# Patient Record
Sex: MALE | Race: BLACK OR AFRICAN AMERICAN | Employment: FULL TIME | URBAN - METROPOLITAN AREA
[De-identification: names, ages, dates, MRNs, and addresses within clinical notes are randomized per-mention and may not be internally consistent; named-entity substitution may affect disease eponyms.]

---

## 2021-01-25 ENCOUNTER — OFFICE VISIT (OUTPATIENT)
Dept: ORTHOPEDIC SURGERY | Age: 26
End: 2021-01-25
Payer: MEDICAID

## 2021-01-25 VITALS — HEIGHT: 68 IN | TEMPERATURE: 98.2 F | WEIGHT: 162 LBS | BODY MASS INDEX: 24.55 KG/M2

## 2021-01-25 DIAGNOSIS — M25.511 ACUTE PAIN OF RIGHT SHOULDER: Primary | ICD-10-CM

## 2021-01-25 DIAGNOSIS — S29.011A RUPTURE OF PECTORALIS MAJOR MUSCLE, INITIAL ENCOUNTER: ICD-10-CM

## 2021-01-25 PROCEDURE — 99203 OFFICE O/P NEW LOW 30 MIN: CPT | Performed by: ORTHOPAEDIC SURGERY

## 2021-01-25 SDOH — HEALTH STABILITY: MENTAL HEALTH: HOW MANY STANDARD DRINKS CONTAINING ALCOHOL DO YOU HAVE ON A TYPICAL DAY?: NOT ASKED

## 2021-01-25 NOTE — PROGRESS NOTES
Height: 5' 8\" (1.727 m)  Weight: [unfilled]  BMI:  Body mass index is 24.63 kg/m². General: The patient is alert and oriented x 3, appears to be stated age and in no distress. HEENT: head is normocephalic, atraumatic. EOMI. Neck: supple, trachea midline, no thyromegaly   Cardiovascular: peripheral pulses palpable. Normal Capillary refill   Respiratory: breathing unlabored, chest expansion symmetric   Skin: no rash, no open wounds, no erythema  Psych: normal affect; mood stable  Neurologic: gait normal, sensation grossly intact in extremities  MSK:    Cervical Spine: There is no tenderness to palpation along the cervical spine. Range of motion is normal.  Spurling's is negative    Shoulder Exam:   Exam shows some bruising along the medial aspect of the upper arm, as well as some asymmetry and contour the pectoralis major tendon. There is some tenderness with palpation across the pec. There is good range of motion of the shoulder. Biceps appears normal    IMAGING:     XRAY:  3 views of the right shoulder show no acute abnormality    Impression: Normal shoulder x-ray    Radiographic findings reviewed with patient    ASSESSMENT   Right shoulder pectoralis major tear      PLAN  We discussed his injury today. Concern is for pec tear. He is young and active and wants to remain active and continue with heavy lifting. I would like to obtain an MRI to assess for partial versus complete rupture. We will discuss treatment options pending MRI results.         Yareli Dubois MD  Orthopaedic Surgery   1/25/21  2:39 PM

## 2021-01-26 ENCOUNTER — HOSPITAL ENCOUNTER (OUTPATIENT)
Dept: MRI IMAGING | Age: 26
Discharge: HOME | End: 2021-01-28
Payer: MEDICAID

## 2021-01-26 DIAGNOSIS — S29.011A RUPTURE OF PECTORALIS MAJOR MUSCLE, INITIAL ENCOUNTER: ICD-10-CM

## 2021-01-26 PROCEDURE — 73221 MRI JOINT UPR EXTREM W/O DYE: CPT

## 2021-02-03 ENCOUNTER — OFFICE VISIT (OUTPATIENT)
Dept: ORTHOPEDIC SURGERY | Age: 26
End: 2021-02-03
Payer: MEDICAID

## 2021-02-03 VITALS — TEMPERATURE: 97.2 F

## 2021-02-03 DIAGNOSIS — Z01.818 PRE-OPERATIVE EXAM: Primary | ICD-10-CM

## 2021-02-03 DIAGNOSIS — S29.011A RUPTURE OF PECTORALIS MAJOR MUSCLE, INITIAL ENCOUNTER: ICD-10-CM

## 2021-02-03 PROCEDURE — 99213 OFFICE O/P EST LOW 20 MIN: CPT | Performed by: ORTHOPAEDIC SURGERY

## 2021-02-03 RX ORDER — KETOROLAC TROMETHAMINE 10 MG/1
10 TABLET, FILM COATED ORAL EVERY 6 HOURS PRN
Qty: 8 TABLET | Refills: 0 | Status: SHIPPED
Start: 2021-02-03 | End: 2021-02-19

## 2021-02-03 RX ORDER — HYDROCODONE BITARTRATE AND ACETAMINOPHEN 5; 325 MG/1; MG/1
1 TABLET ORAL EVERY 6 HOURS PRN
Qty: 20 TABLET | Refills: 0 | Status: SHIPPED
Start: 2021-02-03 | End: 2021-02-08

## 2021-02-03 RX ORDER — KETOROLAC TROMETHAMINE 10 MG/1
10 TABLET, FILM COATED ORAL EVERY 6 HOURS
Qty: 8 TABLET | Refills: 0 | Status: SHIPPED
Start: 2021-02-03 | End: 2021-02-19

## 2021-02-03 RX ORDER — HYDROCODONE BITARTRATE AND ACETAMINOPHEN 5; 325 MG/1; MG/1
1 TABLET ORAL EVERY 6 HOURS PRN
Qty: 28 TABLET | Refills: 0 | Status: SHIPPED | OUTPATIENT
Start: 2021-02-03 | End: 2021-02-10

## 2021-02-03 NOTE — PROGRESS NOTES
Department of Orthopedic Surgery  Attending Pre-operative History and Physical        DIAGNOSIS:  Right shoulder pectoralis major tear    INDICATION:  Failed conservative management    PROCEDURE:  RIGHT PECTORALIS REPAIR   ([address])    CHIEF COMPLAINT:  Right arm pain    History Obtained From:  patient    HISTORY OF PRESENT ILLNESS:      The patient is a 26 y.o. male with significant past medical history of right pectoralis major injury while bench pressing. He felt a pop in the shoulder. He was seen in the office and clinical exam showed bruising is asymmetry in the pectoralis muscle consistent with tear. This was confirmed with MRI. Given his young age and activity level he was offered surgical management. We discussed this would involve right pectoralis major repair. We discussed surgery including risks which include but are not limited to infection, retear, partial tear not requiring repair, DVT/pulmonary embolus, neurovascular injury, unforeseen risks. He understands would like to proceed. Past Medical History:    No past medical history on file. Past Surgical History:        Procedure Laterality Date    ANTERIOR CRUCIATE LIGAMENT REPAIR Left 2015       Medications Prior to Admission:   Not in a hospital admission. Allergies:  Patient has no known allergies. History of allergic reaction to anesthesia:  Yes, PONV     Social History:   TOBACCO:   reports that he has never smoked. He has never used smokeless tobacco.  ETOH:   reports current alcohol use. DRUGS:   reports current drug use. Drug: Marijuana. Family History:   No family history on file.     REVIEW OF SYSTEMS:  CONSTITUTIONAL:  negative  RESPIRATORY:  negative  CARDIOVASCULAR:  negative  MUSCULOSKELETAL:  negative except for  HPI  NEUROLOGICAL:  negative    PHYSICAL EXAM:     VITALS:  Temp 97.2 °F (36.2 °C) (Infrared)     Gen: AOx3, NAD    Heart:  normal apical pulses, normal S1 and S2 and no edema    Lungs:  No increased work of breathing, good air exchange     Abdomen:  no scars, non-distended and non-tender    Extremities:  No clubbing, cyanosis, or edema    Musculoskeletal: Exam shows some bruising remaining over the upper right arm. There is abnormality the contour of the pectoralis as it crosses the deltopectoral interval.  He has some pain with resisted adduction. Remainder of shoulder exam is benign. He has no motor and sensory deficits      DATA:  CBC: No results found for: WBC, RBC, HGB, HCT, MCV, MCH, MCHC, RDW, PLT, MPV  BMP:  No results found for: NA, K, CL, CO2, BUN, LABALBU, CREATININE, CALCIUM, GFRAA, LABGLOM, GLUCOSE    Radiology Review: MRI reviewed showing tear probably more musculotendinous junction of the pectoralis major    ASSESSMENT AND PLAN:    1. Patient is a 26 y.o. male with above specified procedure planned right pectoralis major repair with anesthesia    2. Procedure options, risks and benefits reviewed with patient. Patient expresses understanding and has signed consent form to proceed.     3.  Patient and family were provided with pre-op and post-op instructions, prescription medications, and any other supplied needed post op (slings, braces, etc.)    Controlled Substances Monitoring:            Farhad Barreto MD  Orthopaedic Surgery   2/3/21  3:02 PM

## 2021-02-05 ENCOUNTER — HOSPITAL ENCOUNTER (OUTPATIENT)
Age: 26
Discharge: HOME | End: 2021-02-07
Payer: MEDICAID

## 2021-02-05 DIAGNOSIS — Z01.818 PREOP TESTING: ICD-10-CM

## 2021-02-05 PROCEDURE — U0003 INFECTIOUS AGENT DETECTION BY NUCLEIC ACID (DNA OR RNA); SEVERE ACUTE RESPIRATORY SYNDROME CORONAVIRUS 2 (SARS-COV-2) (CORONAVIRUS DISEASE [COVID-19]), AMPLIFIED PROBE TECHNIQUE, MAKING USE OF HIGH THROUGHPUT TECHNOLOGIES AS DESCRIBED BY CMS-2020-01-R: HCPCS

## 2021-02-06 LAB
SARS-COV-2: NOT DETECTED
SOURCE: NORMAL

## 2021-02-08 NOTE — PROGRESS NOTES
[facility] PRE-ADMISSION TESTING INSTRUCTIONS    The Preadmission Testing patient is instructed accordingly using the following criteria (check applicable):    ARRIVAL INSTRUCTIONS:  [x] Parking the day of Surgery is located in the Main Entrance lot. Upon entering the door, make an immediate right to the surgery reception desk    [x] Bring photo ID and insurance card    [] Bring in a copy of Living will or Durable Power of  papers. [x] Please be sure to arrange for responsible adult to provide transportation to and from the hospital    [x] Please arrange for responsible adult to be with you for the 24 hour period post procedure due to having anesthesia      GENERAL INSTRUCTIONS:    [x] Nothing by mouth after midnight, including gum, candy, mints or water    [x] You may brush your teeth, but do not swallow any water    [] Take medications as instructed with 1-2 oz of water    [x] Stop herbal supplements and vitamins 5 days prior to procedure    [] Follow preop dosing of blood thinners per physician instructions    [] Take 1/2 dose of evening insulin, but no insulin after midnight    [] No oral diabetic medications after midnight    [] If diabetic and have low blood sugar or feel symptomatic, take 1-2oz apple juice only    [] Bring inhalers day of surgery    [] Bring C-PAP/ Bi-Pap day of surgery    [] Bring urine specimen day of surgery    [x] Shower or bath with soap, lather and rinse well, AM of Surgery, no lotion, powders or creams     [] Follow bowel prep as instructed per surgeon    [x] No tobacco products within 24 hours of surgery     [x] No alcohol or illegal drug use within 24 hours of surgery.     [x] Jewelry, body piercing's, eyeglasses, contact lenses and dentures are not permitted into surgery (bring cases)      [] Please do not wear any nail polish, make up or hair products on the day of surgery [x] You can expect a call the business day prior to procedure to notify you if your arrival time changes    [x] If you receive a survey after surgery we would greatly appreciate your comments    [] Parent/guardian of a minor must accompany their child and remain on the premises  the entire time they are under our care     [] Pediatric patients may bring favorite toy, blanket or comfort item with them    [] A caregiver or family member must remain with the patient during their stay if they are mentally handicapped, have dementia, disoriented or unable to use a call light or would be a safety concern if left unattended    [x] Please notify surgeon if you develop any illness between now and time of surgery (cold, cough, sore throat, fever, nausea, vomiting) or any signs of infections  including skin, wounds, and dental.    []  The Outpatient Pharmacy is available to fill your prescription here on your day of surgery, ask your preop nurse for details    [] Other instructions    EDUCATIONAL MATERIALS PROVIDED:    [] PAT Preoperative Education Packet/Booklet     [] Medication List    [] Transfusion bracelet applied with instructions    [] Shower with soap, lather and rinse well, and use CHG wipes provided the evening before surgery as instructed    [] Incentive spirometer with instructions

## 2021-02-10 ENCOUNTER — ANESTHESIA EVENT (OUTPATIENT)
Dept: OPERATING ROOM | Age: 26
End: 2021-02-10
Payer: MEDICAID

## 2021-02-11 ENCOUNTER — HOSPITAL ENCOUNTER (OUTPATIENT)
Age: 26
Setting detail: OUTPATIENT SURGERY
Discharge: HOME | End: 2021-02-11
Attending: ORTHOPAEDIC SURGERY | Admitting: ORTHOPAEDIC SURGERY
Payer: MEDICAID

## 2021-02-11 ENCOUNTER — ANESTHESIA (OUTPATIENT)
Dept: OPERATING ROOM | Age: 26
End: 2021-02-11
Payer: MEDICAID

## 2021-02-11 VITALS
RESPIRATION RATE: 14 BRPM | HEART RATE: 75 BPM | WEIGHT: 162 LBS | BODY MASS INDEX: 24.55 KG/M2 | SYSTOLIC BLOOD PRESSURE: 139 MMHG | OXYGEN SATURATION: 97 % | HEIGHT: 68 IN | DIASTOLIC BLOOD PRESSURE: 77 MMHG | TEMPERATURE: 96.1 F

## 2021-02-11 VITALS
RESPIRATION RATE: 2 BRPM | TEMPERATURE: 94.5 F | SYSTOLIC BLOOD PRESSURE: 147 MMHG | DIASTOLIC BLOOD PRESSURE: 76 MMHG | OXYGEN SATURATION: 100 %

## 2021-02-11 DIAGNOSIS — Z01.818 PREOP TESTING: Primary | ICD-10-CM

## 2021-02-11 PROCEDURE — 7100000001 HC PACU RECOVERY - ADDTL 15 MIN: Performed by: ORTHOPAEDIC SURGERY

## 2021-02-11 PROCEDURE — C1713 ANCHOR/SCREW BN/BN,TIS/BN: HCPCS | Performed by: ORTHOPAEDIC SURGERY

## 2021-02-11 PROCEDURE — 6360000002 HC RX W HCPCS: Performed by: ANESTHESIOLOGY

## 2021-02-11 PROCEDURE — 7100000010 HC PHASE II RECOVERY - FIRST 15 MIN: Performed by: ORTHOPAEDIC SURGERY

## 2021-02-11 PROCEDURE — 6360000002 HC RX W HCPCS

## 2021-02-11 PROCEDURE — 3700000001 HC ADD 15 MINUTES (ANESTHESIA): Performed by: ORTHOPAEDIC SURGERY

## 2021-02-11 PROCEDURE — L3650 SO 8 ABD RESTRAINT PRE OTS: HCPCS | Performed by: ORTHOPAEDIC SURGERY

## 2021-02-11 PROCEDURE — 3600000003 HC SURGERY LEVEL 3 BASE: Performed by: ORTHOPAEDIC SURGERY

## 2021-02-11 PROCEDURE — 3600000013 HC SURGERY LEVEL 3 ADDTL 15MIN: Performed by: ORTHOPAEDIC SURGERY

## 2021-02-11 PROCEDURE — 2500000003 HC RX 250 WO HCPCS: Performed by: NURSE ANESTHETIST, CERTIFIED REGISTERED

## 2021-02-11 PROCEDURE — 2709999900 HC NON-CHARGEABLE SUPPLY: Performed by: ORTHOPAEDIC SURGERY

## 2021-02-11 PROCEDURE — 6360000002 HC RX W HCPCS: Performed by: NURSE ANESTHETIST, CERTIFIED REGISTERED

## 2021-02-11 PROCEDURE — 6360000002 HC RX W HCPCS: Performed by: NURSE PRACTITIONER

## 2021-02-11 PROCEDURE — 2580000003 HC RX 258: Performed by: NURSE ANESTHETIST, CERTIFIED REGISTERED

## 2021-02-11 PROCEDURE — 64415 NJX AA&/STRD BRCH PLXS IMG: CPT | Performed by: ANESTHESIOLOGY

## 2021-02-11 PROCEDURE — 24341 RPR TDN/MUSC UPR A/E EACH: CPT | Performed by: ORTHOPAEDIC SURGERY

## 2021-02-11 PROCEDURE — 7100000000 HC PACU RECOVERY - FIRST 15 MIN: Performed by: ORTHOPAEDIC SURGERY

## 2021-02-11 PROCEDURE — 7100000011 HC PHASE II RECOVERY - ADDTL 15 MIN: Performed by: ORTHOPAEDIC SURGERY

## 2021-02-11 PROCEDURE — 3700000000 HC ANESTHESIA ATTENDED CARE: Performed by: ORTHOPAEDIC SURGERY

## 2021-02-11 PROCEDURE — 2500000003 HC RX 250 WO HCPCS: Performed by: ORTHOPAEDIC SURGERY

## 2021-02-11 PROCEDURE — 2500000003 HC RX 250 WO HCPCS

## 2021-02-11 DEVICE — KIT REP INCL 3 L PEC BTTN ATTCH INSRT FIBERTAPE SUTS W/ NDL: Type: IMPLANTABLE DEVICE | Site: CHEST | Status: FUNCTIONAL

## 2021-02-11 RX ORDER — ROPIVACAINE HYDROCHLORIDE 5 MG/ML
30 INJECTION, SOLUTION EPIDURAL; INFILTRATION; PERINEURAL ONCE
Status: COMPLETED | OUTPATIENT
Start: 2021-02-11 | End: 2021-02-11

## 2021-02-11 RX ORDER — SODIUM CHLORIDE 9 MG/ML
INJECTION, SOLUTION INTRAVENOUS CONTINUOUS PRN
Status: DISCONTINUED | OUTPATIENT
Start: 2021-02-11 | End: 2021-02-11 | Stop reason: SDUPTHER

## 2021-02-11 RX ORDER — FENTANYL CITRATE 50 UG/ML
INJECTION, SOLUTION INTRAMUSCULAR; INTRAVENOUS PRN
Status: DISCONTINUED | OUTPATIENT
Start: 2021-02-11 | End: 2021-02-11 | Stop reason: SDUPTHER

## 2021-02-11 RX ORDER — MEPERIDINE HYDROCHLORIDE 25 MG/ML
12.5 INJECTION INTRAMUSCULAR; INTRAVENOUS; SUBCUTANEOUS EVERY 5 MIN PRN
Status: DISCONTINUED | OUTPATIENT
Start: 2021-02-11 | End: 2021-02-11 | Stop reason: HOSPADM

## 2021-02-11 RX ORDER — SODIUM CHLORIDE 0.9 % (FLUSH) 0.9 %
10 SYRINGE (ML) INJECTION EVERY 12 HOURS SCHEDULED
Status: DISCONTINUED | OUTPATIENT
Start: 2021-02-11 | End: 2021-02-11 | Stop reason: HOSPADM

## 2021-02-11 RX ORDER — PROPOFOL 10 MG/ML
INJECTION, EMULSION INTRAVENOUS PRN
Status: DISCONTINUED | OUTPATIENT
Start: 2021-02-11 | End: 2021-02-11 | Stop reason: SDUPTHER

## 2021-02-11 RX ORDER — MIDAZOLAM HYDROCHLORIDE 2 MG/2ML
1 INJECTION, SOLUTION INTRAMUSCULAR; INTRAVENOUS EVERY 5 MIN PRN
Status: DISCONTINUED | OUTPATIENT
Start: 2021-02-11 | End: 2021-02-11 | Stop reason: HOSPADM

## 2021-02-11 RX ORDER — DEXAMETHASONE SODIUM PHOSPHATE 4 MG/ML
INJECTION, SOLUTION INTRA-ARTICULAR; INTRALESIONAL; INTRAMUSCULAR; INTRAVENOUS; SOFT TISSUE PRN
Status: DISCONTINUED | OUTPATIENT
Start: 2021-02-11 | End: 2021-02-11 | Stop reason: SDUPTHER

## 2021-02-11 RX ORDER — NEOSTIGMINE METHYLSULFATE 1 MG/ML
INJECTION, SOLUTION INTRAVENOUS PRN
Status: DISCONTINUED | OUTPATIENT
Start: 2021-02-11 | End: 2021-02-11

## 2021-02-11 RX ORDER — GLYCOPYRROLATE 1 MG/5 ML
SYRINGE (ML) INTRAVENOUS PRN
Status: DISCONTINUED | OUTPATIENT
Start: 2021-02-11 | End: 2021-02-11 | Stop reason: SDUPTHER

## 2021-02-11 RX ORDER — SODIUM CHLORIDE 0.9 % (FLUSH) 0.9 %
10 SYRINGE (ML) INJECTION PRN
Status: DISCONTINUED | OUTPATIENT
Start: 2021-02-11 | End: 2021-02-11 | Stop reason: HOSPADM

## 2021-02-11 RX ORDER — FENTANYL CITRATE 50 UG/ML
INJECTION, SOLUTION INTRAMUSCULAR; INTRAVENOUS
Status: COMPLETED
Start: 2021-02-11 | End: 2021-02-11

## 2021-02-11 RX ORDER — ROCURONIUM BROMIDE 10 MG/ML
INJECTION, SOLUTION INTRAVENOUS PRN
Status: DISCONTINUED | OUTPATIENT
Start: 2021-02-11 | End: 2021-02-11 | Stop reason: SDUPTHER

## 2021-02-11 RX ORDER — ONDANSETRON 2 MG/ML
INJECTION INTRAMUSCULAR; INTRAVENOUS PRN
Status: DISCONTINUED | OUTPATIENT
Start: 2021-02-11 | End: 2021-02-11 | Stop reason: SDUPTHER

## 2021-02-11 RX ORDER — NEOSTIGMINE METHYLSULFATE 1 MG/ML
INJECTION, SOLUTION INTRAVENOUS PRN
Status: DISCONTINUED | OUTPATIENT
Start: 2021-02-11 | End: 2021-02-11 | Stop reason: SDUPTHER

## 2021-02-11 RX ORDER — MIDAZOLAM HYDROCHLORIDE 1 MG/ML
INJECTION INTRAMUSCULAR; INTRAVENOUS PRN
Status: DISCONTINUED | OUTPATIENT
Start: 2021-02-11 | End: 2021-02-11 | Stop reason: SDUPTHER

## 2021-02-11 RX ORDER — ONDANSETRON 2 MG/ML
4 INJECTION INTRAMUSCULAR; INTRAVENOUS
Status: DISCONTINUED | OUTPATIENT
Start: 2021-02-11 | End: 2021-02-11 | Stop reason: HOSPADM

## 2021-02-11 RX ADMIN — MIDAZOLAM 1 MG: 1 INJECTION INTRAMUSCULAR; INTRAVENOUS at 12:08

## 2021-02-11 RX ADMIN — DEXAMETHASONE SODIUM PHOSPHATE 10 MG: 4 INJECTION, SOLUTION INTRAMUSCULAR; INTRAVENOUS at 13:00

## 2021-02-11 RX ADMIN — SODIUM CHLORIDE: 9 INJECTION, SOLUTION INTRAVENOUS at 12:58

## 2021-02-11 RX ADMIN — MIDAZOLAM HYDROCHLORIDE 2 MG: 1 INJECTION, SOLUTION INTRAMUSCULAR; INTRAVENOUS at 10:04

## 2021-02-11 RX ADMIN — FENTANYL CITRATE 100 MCG: 50 INJECTION, SOLUTION INTRAMUSCULAR; INTRAVENOUS at 10:04

## 2021-02-11 RX ADMIN — SODIUM CHLORIDE: 9 INJECTION, SOLUTION INTRAVENOUS at 12:12

## 2021-02-11 RX ADMIN — SUGAMMADEX 147 MG: 100 INJECTION, SOLUTION INTRAVENOUS at 14:54

## 2021-02-11 RX ADMIN — PROPOFOL 200 MG: 10 INJECTION, EMULSION INTRAVENOUS at 12:17

## 2021-02-11 RX ADMIN — SODIUM CHLORIDE: 9 INJECTION, SOLUTION INTRAVENOUS at 14:35

## 2021-02-11 RX ADMIN — FENTANYL CITRATE 100 MCG: 50 INJECTION, SOLUTION INTRAMUSCULAR; INTRAVENOUS at 12:17

## 2021-02-11 RX ADMIN — MIDAZOLAM 1 MG: 1 INJECTION INTRAMUSCULAR; INTRAVENOUS at 12:13

## 2021-02-11 RX ADMIN — Medication 3 MG: at 14:08

## 2021-02-11 RX ADMIN — ROCURONIUM BROMIDE 50 MG: 10 INJECTION, SOLUTION INTRAVENOUS at 12:17

## 2021-02-11 RX ADMIN — Medication 2 G: at 12:15

## 2021-02-11 RX ADMIN — ROCURONIUM BROMIDE 10 MG: 10 INJECTION, SOLUTION INTRAVENOUS at 12:45

## 2021-02-11 RX ADMIN — MEPERIDINE HYDROCHLORIDE 12.5 MG: 25 INJECTION, SOLUTION INTRAMUSCULAR; INTRAVENOUS; SUBCUTANEOUS at 15:12

## 2021-02-11 RX ADMIN — ONDANSETRON 4 MG: 2 INJECTION INTRAMUSCULAR; INTRAVENOUS at 13:00

## 2021-02-11 RX ADMIN — ROPIVACAINE HYDROCHLORIDE 30 ML: 5 INJECTION, SOLUTION EPIDURAL; INFILTRATION; PERINEURAL at 10:09

## 2021-02-11 RX ADMIN — ROCURONIUM BROMIDE 50 MG: 10 INJECTION, SOLUTION INTRAVENOUS at 12:25

## 2021-02-11 RX ADMIN — Medication 0.6 MG: at 14:08

## 2021-02-11 RX ADMIN — FENTANYL CITRATE 50 MCG: 50 INJECTION, SOLUTION INTRAMUSCULAR; INTRAVENOUS at 12:45

## 2021-02-11 ASSESSMENT — PULMONARY FUNCTION TESTS
PIF_VALUE: 12
PIF_VALUE: 13
PIF_VALUE: 3
PIF_VALUE: 13
PIF_VALUE: 10
PIF_VALUE: 23
PIF_VALUE: 13
PIF_VALUE: 18
PIF_VALUE: 13
PIF_VALUE: 13
PIF_VALUE: 18
PIF_VALUE: 13
PIF_VALUE: 4
PIF_VALUE: 13
PIF_VALUE: 1
PIF_VALUE: 18
PIF_VALUE: 12
PIF_VALUE: 1
PIF_VALUE: 13
PIF_VALUE: 12
PIF_VALUE: 18
PIF_VALUE: 12
PIF_VALUE: 2
PIF_VALUE: 6
PIF_VALUE: 13
PIF_VALUE: 12
PIF_VALUE: 13
PIF_VALUE: 6
PIF_VALUE: 13
PIF_VALUE: 13
PIF_VALUE: 2
PIF_VALUE: 13
PIF_VALUE: 12
PIF_VALUE: 14
PIF_VALUE: 2
PIF_VALUE: 18
PIF_VALUE: 12
PIF_VALUE: 13
PIF_VALUE: 13
PIF_VALUE: 0
PIF_VALUE: 13
PIF_VALUE: 18
PIF_VALUE: 13
PIF_VALUE: 3
PIF_VALUE: 12
PIF_VALUE: 1
PIF_VALUE: 2
PIF_VALUE: 13
PIF_VALUE: 2
PIF_VALUE: 12
PIF_VALUE: 12
PIF_VALUE: 13
PIF_VALUE: 13
PIF_VALUE: 14
PIF_VALUE: 3
PIF_VALUE: 13
PIF_VALUE: 12
PIF_VALUE: 7
PIF_VALUE: 12
PIF_VALUE: 2
PIF_VALUE: 13
PIF_VALUE: 13
PIF_VALUE: 0
PIF_VALUE: 19
PIF_VALUE: 12
PIF_VALUE: 14
PIF_VALUE: 3
PIF_VALUE: 13
PIF_VALUE: 13
PIF_VALUE: 14
PIF_VALUE: 14
PIF_VALUE: 12
PIF_VALUE: 12
PIF_VALUE: 4
PIF_VALUE: 12
PIF_VALUE: 13
PIF_VALUE: 5
PIF_VALUE: 12
PIF_VALUE: 13
PIF_VALUE: 18
PIF_VALUE: 4
PIF_VALUE: 12
PIF_VALUE: 18

## 2021-02-11 ASSESSMENT — PAIN SCALES - GENERAL
PAINLEVEL_OUTOF10: 0

## 2021-02-11 ASSESSMENT — PAIN DESCRIPTION - DESCRIPTORS: DESCRIPTORS: ACHING

## 2021-02-11 NOTE — OP NOTE
OPERATIVE REPORT    PATIENT:  Frida Palacios  [id number]    DATE OF PROCEDURE:  2/11/21    SURGEON: Ro Springer MD    ASSISTANT:  Eduardo Faye DO, Ann Jenkins DO, oTnia Suarez CNP. CNP was necessary for positioning, prepping/draping, intra-operative assistance, and wound closure      PREOPERATIVE DIAGNOSIS: Right pectoralis major tendon tear    POSTOPERATIVE DIAGNOSIS: Same    OPERATION: Right pectoralis major tendon repair    ANESTHESIA: . general and interscalene block    OPERATIVE INDICATIONS: The patient is a 21-year-old right-hand-dominant male, recreational , who was bench pressing on January 18 when he felt a ripping sensation in the right shoulder. He had bruising afterwards. He was eventually seen by me in the office and underwent MRI which showed pectoralis major tendon tear. Given his young age and activity level and desire to remain active including heavy lifting he was interested in surgical management. We discussed this would involve right pectoralis major tendon repair. Risks were discussed in detail including but not limited to infection, neurovascular injury, retear, weakness, DVT/pulmonary embolus, need for revision surgery, unforeseen risks. .   The patient understood these risks, signed an informed consent, and elected to proceed      OPERATIVE PROCEDURE:   Patient was brought to the operating room with [facility].  He was transferred to the OR table with the T-max attachment. General endotracheal anesthesia was induced. He had undergone interscalene block in the preoperative holding area prior. The chair was brought up into a beachchair position and the head was well secured with the headrest and facemask. Kidney rest were placed to secure the patient. The right arm was cleansed with alcohol and axilla was shaved. The right arm was then prepped and draped in sterile fashion. Prior to incision, 2 g of Ancef was given for antibiotic prophylaxis. An incision was traced out over the deltopectoral interval.  The bottom half of the interval was then utilized and skin incision was made and dissection was carried down to the interval which was bluntly dissected. Cephalic vein was identified and taken lateral.  We came down to the clavipectoral fascia. We identified the biceps tendon proximally. We were able to see the pectoralis major tendon which from the anterior portion appeared to be intact. We then dissected along the inferior border of the pec and we were able to identify the sternal head which had torn and was somewhat scarred into the underside of the muscle belly of the pectoralis which remained attached. This was worked to be freed up and mobilized. We also noted that the clavicular head attachment on humerus was somewhat tenuous. We felt that we could augment this as well. After whipstitch was passed on the sternal head, a button was placed, we drilled lateral to the bicipital groove, down to the button into the shaft, and then sequentially toggled the sutures to bring the tendon down to the bone. Sutures were then tied. We then focused on the more anterior clavicular head. We whipstitched the distal portion of this without fully detaching it from the humerus as there was still a very thin insertion. We likewise drilled another hole lateral to the groove distal to our previous hole, dunked our button in the hole,  and then sequentially toggled the sutures to bring the tendon down, we then passed the sutures up through the tendon and tied them. This gave us a nice repair with 2 separate buttons repairing both heads of the pectoralis. The wound was then copiously irrigated. Wound was then closed in layered fashion including loose closure of the interval, subcutaneous closure of skin with application of sterile dressing. The patient was then placed into a sling, awoken from anesthesia, and taken the recovery room in stable condition. He tolerated the procedure well          IMPLANTS:   Implant Name Type Inv. Item Serial No.  Lot No. LRB No. Used Action   KIT REP INCL 3 L PEC BTTN ATTCH INSRT FIBERTAPE SUTS W/ NDL  KIT REP INCL 3 L PEC BTTN ATTCH INSRT FIBERTAPE SUTS W/ NDL  [employer] [id number] Right 1 Implanted       ESTIMATED BLOOD LOSS: 50 mL    COMPLICATIONS: none    POST OPERATIVE PLAN: Patient will be placed into a sling. He will be discharged from same-day surgery. He will ice and take pain medication as needed. We will see him in the office in 1 week.       Manny Diego MD  Orthopaedic Surgery   2/11/21  2:38 PM

## 2021-02-11 NOTE — ANESTHESIA POSTPROCEDURE EVALUATION
Department of Anesthesiology  Postprocedure Note    Patient: Kristopher Otto  MRN: [id number]  YOB: 1995  Date of evaluation: 2/11/2021  Time:  4:39 PM     Procedure Summary     Date: 02/11/21 Room / Location: [facility] OR 05 / [facility]    Anesthesia Start: 9441 Anesthesia Stop: 4694    Procedure: RIGHT PECTORALIS REPAIR   ++BEACH CHAIR++  ++ARTHREX++    ++ISB++ (Right Chest) Diagnosis: (RIGHT PECTORALIS TEAR)    Surgeons: Ingrid Cooks, MD Responsible Provider: Chilango Golden MD    Anesthesia Type: general, regional ASA Status: 2          Anesthesia Type: general, regional    Mani Phase I: Mani Score: 10    Mani Phase II:      Last vitals: Reviewed and per EMR flowsheets.        Anesthesia Post Evaluation    Patient location during evaluation: PACU  Patient participation: complete - patient participated  Level of consciousness: awake and alert  Airway patency: patent  Nausea & Vomiting: no vomiting and no nausea  Complications: no  Cardiovascular status: hemodynamically stable  Respiratory status: acceptable  Hydration status: stable

## 2021-02-11 NOTE — H&P
Department of Orthopedic Surgery  Attending Pre-operative History and Physical        DIAGNOSIS:  Right shoulder pectoralis major tear    INDICATION:  Failed conservative management    PROCEDURE:  RIGHT PECTORALIS REPAIR   ([address])    CHIEF COMPLAINT:  Right arm pain    History Obtained From:  patient    HISTORY OF PRESENT ILLNESS:      The patient is a 26 y.o. male with significant past medical history of right pectoralis major injury while bench pressing. He felt a pop in the shoulder. He was seen in the office and clinical exam showed bruising is asymmetry in the pectoralis muscle consistent with tear. This was confirmed with MRI. Given his young age and activity level he was offered surgical management. We discussed this would involve right pectoralis major repair. We discussed surgery including risks which include but are not limited to infection, retear, partial tear not requiring repair, DVT/pulmonary embolus, neurovascular injury, unforeseen risks. He understands would like to proceed. Past Medical History:        Diagnosis Date    Asthma     as a child sports induced / resolved    PONV (postoperative nausea and vomiting)     Torn muscle     right pectoralis       Past Surgical History:        Procedure Laterality Date    ANTERIOR CRUCIATE LIGAMENT REPAIR Left 2015       Medications Prior to Admission:   Medications Prior to Admission: NONFORMULARY, Energizing tab takes daily  ketorolac (TORADOL) 10 MG tablet, Take 1 tablet by mouth every 6 hours for 2 days  ketorolac (TORADOL) 10 MG tablet, Take 1 tablet by mouth every 6 hours as needed for Pain    Allergies:  Patient has no known allergies. History of allergic reaction to anesthesia:  Yes, PONV     Social History:   TOBACCO:   reports that he has never smoked. He has never used smokeless tobacco.  ETOH:   reports current alcohol use. DRUGS:   reports current drug use. Drug: Marijuana.     Family History: History reviewed. No pertinent family history. REVIEW OF SYSTEMS:  CONSTITUTIONAL:  negative  RESPIRATORY:  negative  CARDIOVASCULAR:  negative  MUSCULOSKELETAL:  negative except for  HPI  NEUROLOGICAL:  negative    PHYSICAL EXAM:     VITALS:  /74   Pulse 68   Temp 96.9 °F (36.1 °C) (Temporal)   Resp 18   Ht 5' 8\" (1.727 m)   Wt 162 lb (73.5 kg)   SpO2 97%   BMI 24.63 kg/m²     Gen: AOx3, NAD    Heart:  normal apical pulses, normal S1 and S2 and no edema    Lungs:  No increased work of breathing, good air exchange     Abdomen:  no scars, non-distended and non-tender    Extremities:  No clubbing, cyanosis, or edema    Musculoskeletal: Exam shows some bruising remaining over the upper right arm. There is abnormality the contour of the pectoralis as it crosses the deltopectoral interval.  He has some pain with resisted adduction. Remainder of shoulder exam is benign. He has no motor and sensory deficits      DATA:  CBC: No results found for: WBC, RBC, HGB, HCT, MCV, MCH, MCHC, RDW, PLT, MPV  BMP:  No results found for: NA, K, CL, CO2, BUN, LABALBU, CREATININE, CALCIUM, GFRAA, LABGLOM, GLUCOSE    Radiology Review: MRI reviewed showing tear probably more musculotendinous junction of the pectoralis major    ASSESSMENT AND PLAN:    1. Patient is a 26 y.o. male with above specified procedure planned right pectoralis major repair with anesthesia    2. Procedure options, risks and benefits reviewed with patient. Patient expresses understanding and has signed consent form to proceed. 3.  Patient and family were provided with pre-op and post-op instructions, prescription medications, and any other supplied needed post op (slings, braces, etc.)    Controlled Substances Monitoring:            Octavio Rincon MD  Orthopaedic Surgery   2/3/21  9:12 AM    Update History & Physical    The patient's History and Physical was reviewed with the patient and there were no significant changes.

## 2021-02-11 NOTE — ANESTHESIA PRE PROCEDURE
Department of Anesthesiology  Preprocedure Note       Name:  Mary Galeano   Age:  26 y. o.  :  1995                                          MRN:  [id number]         Date:  2021      Surgeon: Veronica Antonio):  Jakub Madera MD    Procedure: Procedure(s):  RIGHT PECTORALIS REPAIR   ++BEACH CHAIR++  ++ARTHREX++    ++ISB++    Medications prior to admission:   Prior to Admission medications    Medication Sig Start Date End Date Taking? Authorizing Provider   NONFORMULARY Energizing tab takes daily   Yes Historical Provider, MD   ketorolac (TORADOL) 10 MG tablet Take 1 tablet by mouth every 6 hours for 2 days 2/3/21 2/5/21  CLEMENT Vaughan - RYLEE   ketorolac (TORADOL) 10 MG tablet Take 1 tablet by mouth every 6 hours as needed for Pain 2/3/21 2/5/21  Jakub Madera MD       Current medications:    Current Facility-Administered Medications   Medication Dose Route Frequency Provider Last Rate Last Admin    ceFAZolin (ANCEF) 2000 mg in sterile water 20 mL IV syringe  2,000 mg Intravenous On Call to RamyaCLEMENT - CNP        sodium chloride flush 0.9 % injection 10 mL  10 mL Intravenous 2 times per day Trydeep Long, APRN - CNP        sodium chloride flush 0.9 % injection 10 mL  10 mL Intravenous PRN CLEMENT Vaughan - CNP        ropivacaine (NAROPIN) 0.5% injection 30 mL  30 mL Infiltration Once Landy Villanueva MD        midazolam PF (VERSED) injection 1 mg  1 mg Intravenous Q5 Min PRN Landy Villanueva MD           Allergies:  No Known Allergies    Problem List:  There is no problem list on file for this patient.       Past Medical History:        Diagnosis Date    Asthma     as a child sports induced / resolved    PONV (postoperative nausea and vomiting)     Torn muscle     right pectoralis       Past Surgical History:        Procedure Laterality Date    ANTERIOR CRUCIATE LIGAMENT REPAIR Left 2015       Social History:    Social History     Tobacco Use  Smoking status: Never Smoker    Smokeless tobacco: Never Used   Substance Use Topics    Alcohol use: Yes     Comment: social                                Counseling given: Not Answered      Vital Signs (Current):   Vitals:    02/08/21 1429 02/11/21 0902   BP:  131/74   Pulse:  68   Resp:  18   Temp:  96.9 °F (36.1 °C)   TempSrc:  Temporal   SpO2:  97%   Weight: 162 lb (73.5 kg) 162 lb (73.5 kg)   Height: 5' 8\" (1.727 m) 5' 8\" (1.727 m)                                              BP Readings from Last 3 Encounters:   02/11/21 131/74       NPO Status:                                                                                 BMI:   Wt Readings from Last 3 Encounters:   02/11/21 162 lb (73.5 kg)   01/25/21 162 lb (73.5 kg)     Body mass index is 24.63 kg/m². CBC: No results found for: WBC, RBC, HGB, HCT, MCV, RDW, PLT    CMP: No results found for: NA, K, CL, CO2, BUN, CREATININE, GFRAA, AGRATIO, LABGLOM, GLUCOSE, PROT, CALCIUM, BILITOT, ALKPHOS, AST, ALT    POC Tests: No results for input(s): POCGLU, POCNA, POCK, POCCL, POCBUN, POCHEMO, POCHCT in the last 72 hours. Coags: No results found for: PROTIME, INR, APTT    HCG (If Applicable): No results found for: PREGTESTUR, PREGSERUM, HCG, HCGQUANT     ABGs: No results found for: PHART, PO2ART, FWM0ZIB, TGV6BEL, BEART, [id number]     Type & Screen (If Applicable):  No results found for: LABABO, LABRH    Drug/Infectious Status (If Applicable):  No results found for: HIV, HEPCAB    COVID-19 Screening (If Applicable):   Lab Results   Component Value Date    COVID19 Not Detected 02/05/2021         Anesthesia Evaluation  Patient summary reviewed   history of anesthetic complications: PONV.   Airway: Mallampati: II  TM distance: >3 FB   Neck ROM: full  Mouth opening: > = 3 FB Dental: normal exam         Pulmonary: breath sounds clear to auscultation  (+) asthma:                            Cardiovascular:Negative CV ROS            Rhythm: regular  Rate: normal Neuro/Psych:   (+) neuromuscular disease:,             GI/Hepatic/Renal: Neg GI/Hepatic/Renal ROS            Endo/Other: Negative Endo/Other ROS                    Abdominal:         (-) obese     Vascular: negative vascular ROS. Anesthesia Plan      general and regional     ASA 2     (Patient agreed to a right interscalene brachial plexus block)  Induction: intravenous. MIPS: Postoperative opioids intended and Prophylactic antiemetics administered. Anesthetic plan and risks discussed with patient and mother. Plan discussed with CRNA.                   Megan Salgado MD   2/11/2021

## 2021-02-11 NOTE — ANESTHESIA PROCEDURE NOTES
Peripheral Block    Patient location during procedure: pre-op  Staffing  Performed: anesthesiologist   Anesthesiologist: Cherri Linares MD  Preanesthetic Checklist  Completed: patient identified, IV checked, site marked, risks and benefits discussed, surgical consent, monitors and equipment checked, pre-op evaluation, timeout performed, anesthesia consent given, oxygen available and patient being monitored  Peripheral Block  Patient position: supine  Prep: ChloraPrep  Patient monitoring: cardiac monitor, continuous pulse ox, frequent blood pressure checks and IV access  Block type: Brachial plexus  Laterality: right  Injection technique: single-shot  Guidance: ultrasound guided  Local infiltration: ropivacaine  Infiltration strength: 0.5 %  Dose: 30 mL  Interscalene  Provider prep: mask and sterile gloves  Local infiltration: ropivacaine  Needle  Needle gauge: 22 G  Needle length: 5 cm  Needle localization: ultrasound guidance and nerve stimulator  Assessment  Injection assessment: negative aspiration for heme, no paresthesia on injection and local visualized surrounding nerve on ultrasound  Paresthesia pain: none  Slow fractionated injection: yes  Hemodynamics: stable  Additional Notes  U/S [id number].  (1) Under ultrasound guidance, a  gauge needle was inserted and placed in close proximity to the Right brachial plexus nerve.  (2) Ultrasound was also used to visualize the spread of the anesthetic in close proximity to the nerve being blocked. (3) The nerve appeared anatomically normal, and (4 there were no apparent abnormal pathological findings on the image that were readily visible and related to the nerve being blocked. (5) A permanent ultrasound image was saved in the patient's record.             Reason for block: post-op pain management and at surgeon's request

## 2021-02-19 ENCOUNTER — OFFICE VISIT (OUTPATIENT)
Dept: ORTHOPEDIC SURGERY | Age: 26
End: 2021-02-19

## 2021-02-19 VITALS — WEIGHT: 162 LBS | TEMPERATURE: 97.4 F | BODY MASS INDEX: 24.55 KG/M2 | HEIGHT: 68 IN

## 2021-02-19 DIAGNOSIS — S29.011A RUPTURE OF PECTORALIS MAJOR MUSCLE, INITIAL ENCOUNTER: ICD-10-CM

## 2021-02-19 DIAGNOSIS — Z98.890 STATUS POST REPAIR OF PECTORALIS MUSCLE TEAR: Primary | ICD-10-CM

## 2021-02-19 PROCEDURE — 99024 POSTOP FOLLOW-UP VISIT: CPT | Performed by: ORTHOPAEDIC SURGERY

## 2021-02-19 RX ORDER — HYDROCODONE BITARTRATE AND ACETAMINOPHEN 5; 325 MG/1; MG/1
1 TABLET ORAL EVERY 6 HOURS PRN
COMMUNITY
End: 2021-03-29

## 2021-02-19 NOTE — PROGRESS NOTES
Surgical dressings were removed s/p Right pectoralis major tendon tear on 2/11/2021. Incision was cleaned with alcohol prep pads. Minimal swelling and bleeding in area.      [facility]

## 2021-02-19 NOTE — PROGRESS NOTES
Follow Up Post Operative Visit     Surgery: Right pectoralis major tendon tear  Date: 2/11/2021    Subjective:    Joann Guevara is here for follow up visit s/p above procedure. He is doing well. He has been compliant    Controlled Substances Monitoring:        Physical Exam:    Height: 5' 8\" (1.727 m), Weight: 162 lb (73.5 kg)    General: Alert and oriented x3, no acute distress  Cardiovascular/pulmonary: No labored breathing, peripheral perfusion intact  Musculoskeletal:    Exam shows intact incision. Back contour appears normal.  There is minimal swelling and no bruising. He has intact motor and sensory function throughout the arm      Imaging: X-rays including 2 views of the shoulder show good alignment of buttons within the medullary canal of the right humerus    Impression: Good alignment status post pec repair    Assessment and Plan: 1 week out from right pec repair  He is doing well. We discussed precautions. He was given the protocol. He will remain in his sling can come out to do basic exercises starting in 2 weeks.   Follow-up in 5 weeks and we will start him on PT and discontinue sling at that time    Octavio Rincon MD  Orthopaedic Surgery   2/19/21  8:04 AM

## 2021-03-29 ENCOUNTER — OFFICE VISIT (OUTPATIENT)
Dept: ORTHOPEDIC SURGERY | Age: 26
End: 2021-03-29

## 2021-03-29 VITALS — WEIGHT: 162 LBS | BODY MASS INDEX: 24.55 KG/M2 | HEIGHT: 68 IN

## 2021-03-29 DIAGNOSIS — Z98.890 STATUS POST REPAIR OF PECTORALIS MUSCLE TEAR: Primary | ICD-10-CM

## 2021-03-29 PROCEDURE — 99024 POSTOP FOLLOW-UP VISIT: CPT | Performed by: ORTHOPAEDIC SURGERY

## 2021-03-29 NOTE — PROGRESS NOTES
Follow Up Post Operative Visit     Surgery: Right pectoralis major tendon tear  Date: 2/11/2021    Subjective:    Alfonso Guevara is here for follow up visit s/p above procedure. He is doing well. He has been compliant    Controlled Substances Monitoring:        Physical Exam:    Height: 5' 8\" (1.727 m), Weight: 162 lb (73.5 kg)    General: Alert and oriented x3, no acute distress  Cardiovascular/pulmonary: No labored breathing, peripheral perfusion intact  Musculoskeletal:    Exam of the shoulder shows intact incision. There is good contour of the pectoralis major tendon. Biceps contour is normal.  There is intact motor and sensory function throughout the arm      Imaging: No new images. Previous images reviewed    Assessment and Plan: Status post right pectoralis major tendon repair, now about 6 weeks out  He is doing well. We discussed precautions. He was given the protocol today. We will start him on PT.   Follow-up in 6 weeks    Shelby Noriega MD  Orthopaedic Surgery   3/29/21  3:20 PM

## 2021-04-08 ENCOUNTER — EVALUATION (OUTPATIENT)
Dept: PHYSICAL THERAPY | Age: 26
End: 2021-04-08
Payer: COMMERCIAL

## 2021-04-08 DIAGNOSIS — Z98.890 STATUS POST REPAIR OF PECTORALIS MUSCLE TEAR: Primary | ICD-10-CM

## 2021-04-08 PROCEDURE — 97161 PT EVAL LOW COMPLEX 20 MIN: CPT | Performed by: PHYSICAL THERAPIST

## 2021-04-08 PROCEDURE — 97110 THERAPEUTIC EXERCISES: CPT | Performed by: PHYSICAL THERAPIST

## 2021-04-08 NOTE — PROGRESS NOTES
[address] [facility]   Phone: [phone]   Fax: [phone]      Physical Therapy Daily Treatment Note    Date: 2021  Patient Name: Mikel Robbins  : 1995   MRN: [id number]  DOSx: 21   Referring Provider: Chayito Mcduffie MD  [address] [facility]     Medical Diagnosis:     s/p pectoralis repair   Outcome Measure:    quickdash    22.73               20-39%    S: refer to evaluation  O: Pt given written HEP  Time 3341-3811     Visit  Repeat outcome measure at mid point and end. Pain 2/10     Strength      Palpation      ROM R shoulder:  flex/abd= 120*/100*      IR/ER= L4/back of head     Modalities            Manual                  Stretch      Table slides      Wall Flexion      Wall ER stretch      Towel IR stretch      IR reaching behind back      Exercise      UBE 3 min F/B motion only     pulleys for flex/abd 3 min ea     Pendulum Ex 1x20 ea            table st:  flex 3x20s                    abd 3x20s                    ER 3x20s to tightness          supine wand flex 15x3s           shrugs 15x3s     scap retraction 15x3s to tightness     Supine wand ER/IR      Supine flexion      S-lying ABD      S-lying ER      Standing wand flex      Standing flexion      Standing ABD            ROWS: H Functional activities  To aid in ROM and strength needed for reaching , lifting ,pushing and pulling at home/work    ROWS: M  \"    ROWS: L  \"    ER  \"    IR  \"                A:  Tolerated well. Above added to written HEP.   P: Continue with rehab plan  Sue Lunch, MPT    Treatment Charges: Mins Units   Initial Evaluation 26 1   Re-Evaluation     Ther Exercise         TE 20    Manual Therapy     MT     Ther Activities        TA     Gait Training          GT     Neuro Re-education NR     Modalities     Non-Billable Service Time     Other     Total Time/Units 2 46

## 2021-04-08 NOTE — PROGRESS NOTES
normal    Precautions/Contraindications: treatment to progress as per surgeon's protocol     OBJECTIVE:     Observations: normal orthopedic exam    Inspection: normal orthopedic exam.  .        Joint/Motion:  Right Shoulder:  flex/abd= 120*/100*; IR/ER= L4/back of head    Left Shoulder: WNL for all ranges       Strength:  grossly 4, 4+/5 for all ranges R shoulder; 5/5 across R elbow/wrist    Special Tests/Functional Screens:  deferred due to dx  [] Ras []+ / [] -  [] [place]'s []+ / [] -   [] AC Sheer []+ / [] -    [] [address] drawer []+ / [] -    [] Bicep Load []+ / [] -   [] Crank []+ / [] -  [] Clunk []+ / [] -  [] Jared Powell []+ / [] -   [] Rosas Uribe []+ / [] -  [] Perez's []+ / [] -      [] Speed's []+ / [] -   [] Irena's []+ / [] -    [] Sulcus Sign []+ / [] -   [] Apprehension []+ / [] -   [] Bicep Load II []+ / [] -   [] Elbow Valgus []+ / [] -     [] Elbow Varus []+ / [] -   [] Reggie's relocation []+ / [] -   [] Empty Can []+ / [] -  [] Drop arm []+ / [] -  [] ER lag []+ / [] -  [] Painful Arc []+ / [] -  [] Allyssa Baker []+ / [] -  [] Belly Press/ lift off[]+ / [] -  [] Other: []+ / [] -           ASSESSMENT     Outcome Measure:   QuickDASH (Disorders of the Arm, Shoulder, and Hand) 20-39% disability    Problems:    Pain reported 0-2/10 with prolonged activities and endrange  postures   ROM decreased across R shoulder for all ranges    Strength decreased across R shoulder for all ranges   Decreased endurance for all prolonged/functonal  activities     [x] There are no barriers affecting plan of care or recovery    [] Barriers to this patient's plan of care or recovery include.     Domestic Concerns:  [x] No  [] Yes:      Goals (8-10 weeks)   Decrease reported pain to 0/10 with all prolonged/functional activities   Increase Strength across R shoulder to grossly 4+, 5/5 for all ranges    Restore all R shoulder AROM to WNL   Improve endurance for all prolonged activities to [employer] Return to sport activities and assure I with HEP for home management of condition     Rehab Potential: [x] Good  [] Fair  [] Poor    PLAN       Treatment Plan:   [x] Therapeutic Exercise  [x] Therapeutic Activity  [x] Neuromuscular Re-education   [] Gait Training  [] Balance Training  [] Aerobic conditioning  [] Manual Therapy  [] Massage/Fascial release   [] Work/Sport specific activities    [] Pain Neuroscience [] Cold/hotpack  [] Vasocompression  [] Electrical Stimulation  [] Lumbar/Cervical Traction  [] Ultrasound   [] Iontophoresis: 4 mg/mL Dexamethasone Sodium Phosphate 40-80 mAmin  [] Dry Needling      [x] Instruction in HEP      []  Medication allergies reviewed for use of Dexamethasone Sodium Phosphate 4mg/ml  with iontophoresis treatments. Patient is not allergic. The following CPT codes are likely to be used in the care of this patient: 70718 PT Evaluation: Low Complexity , 61544 Therapeutic Exercise , 53496 Neuromuscular Re-Education , 93976 Therapeutic Activities , 62218 Vasopneumatic Device ,  Electrical Stimulation      Suggested Professional Referral: [x] No  [] Yes:     Patient Education:  [x] Plans/Goals, Risks/Benefits discussed  [x] Home exercise program  Method of Education: [x] Verbal  [x] Demo  [x] Written  Comprehension of Education:  [x] Verbalizes understanding. [x] Demonstrates understanding. [] Needs Review. [] Demonstrates/verbalizes understanding of HEP/Ed previously given. Frequency:  2-3 days per week for 8-10 weeks    Patient understands diagnosis/prognosis and consents to treatment, plan and goals: [x] Yes    [] No     Thank you for the opportunity to work with your patient. If you have questions or comments, please contact me at numbers listed above. Electronically signed by: ELIZABETH Hooper    Medicare Patients Only     Please sign Physician's Certification and return to:   [address]  [address] [place] [address] [facility]  Dept: [phone]  Dept Fax: [phone] Certification / Comments     Frequency/Duration 2-3 days per week for 8 weeks. Certification period from 4/8/2021  to 7/8/21. I have reviewed the Plan of Care established for skilled therapy services and certify that the services are required and that they will be provided while the patient is under my care.     Physician's Comments/Revisions:               Physician's Printed Name:                                           [de-identified] Signature:                                                               Date:

## 2021-05-10 ENCOUNTER — OFFICE VISIT (OUTPATIENT)
Dept: ORTHOPEDIC SURGERY | Age: 26
End: 2021-05-10

## 2021-05-10 VITALS — BODY MASS INDEX: 25.11 KG/M2 | HEIGHT: 67 IN | WEIGHT: 160 LBS

## 2021-05-10 DIAGNOSIS — Z98.890 STATUS POST REPAIR OF PECTORALIS MUSCLE TEAR: Primary | ICD-10-CM

## 2021-05-10 DIAGNOSIS — S29.011A RUPTURE OF PECTORALIS MAJOR MUSCLE, INITIAL ENCOUNTER: ICD-10-CM

## 2021-05-10 PROCEDURE — 99024 POSTOP FOLLOW-UP VISIT: CPT | Performed by: ORTHOPAEDIC SURGERY

## 2021-05-10 NOTE — PROGRESS NOTES
Follow Up Post Operative Visit     Surgery: Right pectoralis major tendon tear  Date: 2/11/2021    Subjective:    Nazanin Guevara is here for follow up visit s/p above procedure. He is doing well. Unfortunately he only did 1 session of physical therapy due to some confusion regarding insurance, but he has been doing exercises on his own    Controlled Substances Monitoring:        Physical Exam:    Height: 5' 7\" (1.702 m), Weight: 160 lb (72.6 kg)    General: Alert and oriented x3, no acute distress  Cardiovascular/pulmonary: No labored breathing, peripheral perfusion intact  Musculoskeletal:    Exam of the shoulder shows well-healed incision. Pec contour is normal even with resisted abduction. There is no swelling or erythema. Imaging: No new images. Previous images reviewed    Assessment and Plan: 3 months out from right pectoralis major repair  He is doing well despite not doing any physical therapy aside from home exercises. We will get him back into PT. We gave him a copy of the protocol today. He will still refrain from any push-up or pressing type activities for another 4 weeks. We will see him back in 6 weeks to reassess.     Evelina iLndsay MD  Orthopaedic Surgery   5/10/21  3:52 PM

## 2021-05-12 ENCOUNTER — TREATMENT (OUTPATIENT)
Dept: PHYSICAL THERAPY | Age: 26
End: 2021-05-12
Payer: COMMERCIAL

## 2021-05-12 DIAGNOSIS — Z98.890 STATUS POST REPAIR OF PECTORALIS MUSCLE TEAR: Primary | ICD-10-CM

## 2021-05-12 PROCEDURE — 97112 NEUROMUSCULAR REEDUCATION: CPT | Performed by: PHYSICAL THERAPIST

## 2021-05-12 PROCEDURE — 97110 THERAPEUTIC EXERCISES: CPT | Performed by: PHYSICAL THERAPIST

## 2021-05-12 NOTE — PROGRESS NOTES
[address] [facility] and [facility]   Phone: [phone]   Fax: [phone]      Physical Therapy Daily Treatment Note    Date: 2021  Patient Name: Harlan Pinto  : 1995   MRN: [id number]  DOSx: 21   Referring Provider: Ralph Hilton MD  [address] [facility]     Medical Diagnosis:     s/p pectoralis repair   Outcome Measure:    quickdash    22.73               20-39%    S: The pt reports that he has resumed weight lifting for \"arms\" & \"back\" at the gym. O: Pt given written HEP  Time 5762-1559     Visit  Repeat outcome measure at mid point and end. Pain 2/10     Strength      Palpation      ROM R shoulder:  flex/abd= 120*/100*      IR/ER= L4/back of head     Modalities            Manual                  Stretch      Doorway Stretch high, mid, & low 3x 30s  TE   Wall Flexion      Wall ER stretch      Towel IR stretch      IR reaching behind back      Exercise      Band scap rows high & mid 30x  blue NR   Band sh sh high & mid 30x blue NR   Band reverse flys 30x blue NR   Band sh horizontal abd 30x blue NR   Band sh overhead add behind head 30x blue NR   Band RUE lat pull-down 30x blue NR   Band chest press 30x blue TE   Band chest flys 30x blue TE   Band sh front raise 30x R blue TE   Band sh lateral raise 30x  R blue TE   Supine unilateral  chest press with alternate prolong hold both narrow & wide  3x 10 10lb DB NR   Supine prolong chest press hold 1x 1min hold 10lb DB NR   Standing prolong overhead sh press 1x 1min hold 10lb DB NR   Supine chest pull-over 2x 15 10lb DB TE   Band sit sh add to side overhead 30x blue NR   S-lying ER      Standing wand flex      Standing flexion      Standing ABD            ROWS: H Functional activities  To aid in ROM and strength needed for reaching , lifting ,pushing and pulling at home/work    ROWS: M  \"    ROWS: L  \"    ER  \"    IR  \"                A:  Tolerated well. Above added to written HEP.  The pt progressed to perform new elastic band & dumbbell resistive training with today's Tx session. Also, pt progressed to perform the doorway stretch. P: Continue with rehab plan & progress to include 12-15lb dumbbells & quadruped exercises.      Tony Ellis, PT [facility] [id number]    Treatment Charges: Mins Units   Initial Evaluation     Re-Evaluation     Ther Exercise         TE 20 1   Manual Therapy     MT     Ther Activities        TA     Gait Training          GT     Neuro Re-education NR 25 2   Modalities     Non-Billable Service Time     Other     Total Time/Units 45 3

## 2021-05-13 ENCOUNTER — TREATMENT (OUTPATIENT)
Dept: PHYSICAL THERAPY | Age: 26
End: 2021-05-13
Payer: COMMERCIAL

## 2021-05-13 DIAGNOSIS — Z98.890 STATUS POST REPAIR OF PECTORALIS MUSCLE TEAR: Primary | ICD-10-CM

## 2021-05-13 PROCEDURE — 97112 NEUROMUSCULAR REEDUCATION: CPT | Performed by: PHYSICAL THERAPIST

## 2021-05-13 NOTE — PROGRESS NOTES
[address] [facility] and [facility]   Phone: [phone]   Fax: [phone]      Physical Therapy Daily Treatment Note    Date: 2021  Patient Name: Magi Rider  : 1995   MRN: [id number]  DOSx: 21   Referring Provider: Olga Lidia Ferris MD  [address] [facility]     Medical Diagnosis:     s/p pectoralis repair   Outcome Measure:    quickdash    22.73               20-39%    S: The pt denies experiencing any abnormal chest pain, soreness, & discomfort following yesterday's Tx session. O: Pt given written HEP  Time 1247-5165     Visit 3/18 Repeat outcome measure at mid point and end. Pain 2/10     Strength      Palpation      ROM R shoulder:  flex/abd= 120*/100*      IR/ER= L4/back of head     Modalities            Manual                  Stretch      Doorway Stretch high, mid, & low 3x 30s  TE   Quadruped sh flex 3x 10 R & L  NR   Quadruped push-ups (girls push-ups) 3x 10  NR   Quadruped sh horizontal abd 3x 10 R & L  NR   Quadruped rows 3x 10 R & L 10lb DB NR   Exercise      Table push-ups 3x 10  NR   Quadruped RUE & LUE walk up on step Forward 30x 6'' NR   Quadruped RUE & LUE walk up on step Lateral R & L 30x 6''    Quadruped Bird-dog Alternate UE & LE ext 3x 10  NR         ROWS: H Functional activities  To aid in ROM and strength needed for reaching , lifting ,pushing and pulling at home/work    ROWS: M  \"    ROWS: L  \"    ER  \"    IR  \"                A:  Tolerated well. Above added to written HEP. The progressed with today's Tx session to perform quadruped exercises, & table push-ups without pain or difficulty. P: Continue with rehab plan & progress to include full plank (push-up) position exercises when appropriate.      Whitley Long, PT [facility] [id number]    Treatment Charges: Mins Units   Initial Evaluation     Re-Evaluation     Ther Exercise         TE 5 NB   Manual Therapy     MT     Ther Activities        TA     Gait Training          GT     Neuro

## 2021-05-17 ENCOUNTER — TREATMENT (OUTPATIENT)
Dept: PHYSICAL THERAPY | Age: 26
End: 2021-05-17
Payer: COMMERCIAL

## 2021-05-17 DIAGNOSIS — Z98.890 STATUS POST REPAIR OF PECTORALIS MUSCLE TEAR: Primary | ICD-10-CM

## 2021-05-17 PROCEDURE — 97112 NEUROMUSCULAR REEDUCATION: CPT

## 2021-05-17 NOTE — PROGRESS NOTES
[address] and [facility]   Phone: [phone]   Fax: [phone]      Physical Therapy Daily Treatment Note    Date: 2021  Patient Name: Ailyn Chakraborty  : 1995   MRN: [id number]  DOSx: 21   Referring Provider: Conor Sparks MD  [address]  [facility]     Medical Diagnosis:     s/p pectoralis repair   Outcome Measure:    quickdash    22.73               20-39%    S: The pt denies experiencing any abnormal chest pain, soreness, & discomfort following yesterday's Tx session. O: Pt given written HEP  Time [id number]     Visit  Repeat outcome measure at mid point and end. Pain 2/10     Strength      Palpation      ROM R shoulder:  flex/abd= 120*/100*      IR/ER= L4/back of head     Modalities                        Stretch      Doorway Stretch high, mid, & low 3x 30s  TE   Quadruped sh flex 3x 10 R & L  NR   Quadruped push-ups (girls push-ups) 3x 10  NR    NR   Quadruped rows 3x 10 R & L 10lb DB NR         Exercise             NR   Quadruped RUE & LUE walk up on step Forward 30x 6'' NR   Quadruped RUE & LUE walk up on step Lateral R & L 30x 6''    Quadruped Bird-dog Alternate UE & LE ext 3x 10  NR         Plank  30s x 3  NR   Prone I' Y's T's  30x  3# NR   Standing I's 30x Green TB NR   Standing T's  30x BTB NR               A:  Tolerated well. Progressed pt c plank TE as well as weighted prone exercises. He tolerates c moderate fatigue but denies pain. Will continue to progress as tolerated. P: Continue with rehab plan & progress to include full plank (push-up) position exercises when appropriate.    Tenisha  PTA [id number]    Treatment Charges: Mins Units   Initial Evaluation     Re-Evaluation     Ther Exercise         TE     Manual Therapy     MT     Ther Activities        TA     Gait Training          GT     Neuro Re-education NR 46 3   Modalities     Non-Billable Service Time     Other     Total Time/Units 46 3

## 2021-05-19 ENCOUNTER — TREATMENT (OUTPATIENT)
Dept: PHYSICAL THERAPY | Age: 26
End: 2021-05-19
Payer: COMMERCIAL

## 2021-05-19 DIAGNOSIS — Z98.890 STATUS POST REPAIR OF PECTORALIS MUSCLE TEAR: Primary | ICD-10-CM

## 2021-05-19 PROCEDURE — 97110 THERAPEUTIC EXERCISES: CPT | Performed by: PHYSICAL THERAPIST

## 2021-05-19 PROCEDURE — 97112 NEUROMUSCULAR REEDUCATION: CPT | Performed by: PHYSICAL THERAPIST

## 2021-05-27 ENCOUNTER — TREATMENT (OUTPATIENT)
Dept: PHYSICAL THERAPY | Age: 26
End: 2021-05-27
Payer: COMMERCIAL

## 2021-05-27 DIAGNOSIS — Z98.890 STATUS POST REPAIR OF PECTORALIS MUSCLE TEAR: Primary | ICD-10-CM

## 2021-05-27 PROCEDURE — 97112 NEUROMUSCULAR REEDUCATION: CPT | Performed by: PHYSICAL THERAPIST

## 2021-05-27 PROCEDURE — 97110 THERAPEUTIC EXERCISES: CPT | Performed by: PHYSICAL THERAPIST

## 2021-05-27 NOTE — PROGRESS NOTES
[address] and Rehabilitation   Phone: [phone]   Fax: [phone]      Physical Therapy Daily Treatment Note    Date: 2021  Patient Name: Bell Flores  : 1995   MRN: [id number]  DOSx: 21   Referring Provider: Joey Meyer MD  [address] [facility]     Medical Diagnosis:     s/p pectoralis repair   Outcome Measure:    quickdash    22.73               20-39%    S: The pt reports no pain today. He states he has minor tightness. O: Pt given written HEP  Time [id number]     Visit  Repeat outcome measure at mid point and end. Pain 2/10     Strength      Palpation      ROM R shoulder:  flex/abd= 120*/100*      IR/ER= L4/back of head     Modalities                        Stretch      Doorway Stretch high, mid, & low 3x 30s  TE    NR         Exercise      Supine unilateral  chest press with alternate prolong hold both narrow & wide  3x 10 15lb DB NR   Supine chest pull-over 3x 10 15lb DB TE   Supine chest fly 3x 10 15lb DB TE    NR   Full-plank push-up position 2x 1min hold  NR   Bent over rows 3x10 B 20 lbs TA   3 position cone plank NEXT     Plank bosu 4 direction x10 ea F/b, s/s, circles NR   Full plank bosu 2x1 min  NR   Full-Plank mountain climber 3x 10   NR   Full-plank hip ext 3x 10  NR   A:  Tolerated well. The was progressed c CKC activity and proprioceptive activity to improve stability and function. He tolerated the session well c moderate fatigue and no pain. P: Continue with rehab plan & progress to include full-plank (push-up) position walk-ups & rows when appropriate. Mikaela Perry, PT [facility] [id number]    Treatment Charges: Mins Units   Initial Evaluation     Re-Evaluation     Ther Exercise         TE 10 1   Manual Therapy     MT     Ther Activities        TA     Gait Training          GT     Neuro Re-education NR 30 2   Modalities     Non-Billable Service Time     Other     Total Time/Units 45 3

## 2021-05-28 ENCOUNTER — TREATMENT (OUTPATIENT)
Dept: PHYSICAL THERAPY | Age: 26
End: 2021-05-28
Payer: COMMERCIAL

## 2021-05-28 DIAGNOSIS — Z98.890 STATUS POST REPAIR OF PECTORALIS MUSCLE TEAR: Primary | ICD-10-CM

## 2021-05-28 PROCEDURE — 97112 NEUROMUSCULAR REEDUCATION: CPT | Performed by: PHYSICAL THERAPIST

## 2021-05-28 PROCEDURE — 97110 THERAPEUTIC EXERCISES: CPT | Performed by: PHYSICAL THERAPIST

## 2021-05-28 NOTE — PROGRESS NOTES
[address] and Rehabilitation   Phone: [phone]   Fax: [phone]      Physical Therapy Daily Treatment Note    Date: 2021  Patient Name: Harlan Pinto  : 1995   MRN: [id number]  DOSx: 21   Referring Provider: Ralph Hilton MD  [address] [facility]     Medical Diagnosis:     s/p pectoralis repair   Outcome Measure:    quickdash    22.73               20-39%    S: The pt reports that his right chest feels good today & denies experiencing any abnormal pain following Tx session. O: Pt given written HEP  Time 2787-2523     Visit  Repeat outcome measure at mid point and end. Pain 2/10     Strength      Palpation      ROM R shoulder:  flex/abd= 120*/100*      IR/ER= L4/back of head     Modalities      1/2 push-ups full-plank 3x/10  NR   Full-plank push-up position rows 3x 10 20lb NR   Full-plank alternate UE & LE ext 3x 10 R & L  NR   Stretch      Doorway Stretch high, mid, & low 3x 30s  TE   Bear crawl  3x 15lb DB  1x no DB 1x 10lb DB  NR   Quadruped push-ups (girls push-ups) 3x 10  NR    NR   Full-plank walk-ups on step 3x 10 R & L 6'' step NR   Exercise       NR   A:  Tolerated well. The pt progressed with today's Tx session to perform new full-plank exercises including walk ups, rows, bear crawl, & alternate UE & LE ext. The pt also performed mini 1/2 way push-ups. P: Continue with rehab plan & progress to include usman-disc exercises.      Yo Scott, PT [facility] [id number]    Treatment Charges: Mins Units   Initial Evaluation     Re-Evaluation     Ther Exercise         TE 10 1   Manual Therapy     MT     Ther Activities        TA     Gait Training          GT     Neuro Re-education NR 35 2   Modalities     Non-Billable Service Time     Other     Total Time/Units 45 3

## 2021-06-07 ENCOUNTER — TREATMENT (OUTPATIENT)
Dept: PHYSICAL THERAPY | Age: 26
End: 2021-06-07
Payer: COMMERCIAL

## 2021-06-07 DIAGNOSIS — Z98.890 STATUS POST REPAIR OF PECTORALIS MUSCLE TEAR: Primary | ICD-10-CM

## 2021-06-07 PROCEDURE — 97112 NEUROMUSCULAR REEDUCATION: CPT | Performed by: PHYSICAL THERAPIST

## 2021-06-07 NOTE — PROGRESS NOTES
[address] [facility] [address] [facility] [address] and Rehabilitation   Phone: [phone]   Fax: [phone]      Physical Therapy Daily Treatment Note    Date: 2021  Patient Name: Naeem Trevino  : 1995   MRN: [id number]  DOSx: 21   Referring Provider: Nelda Kay MD  [address] [facility]     Medical Diagnosis:     s/p pectoralis repair   Outcome Measure:    quickdash    22.73               20-39%    S: The pt reports that he has noticed increased tightness at right pec since not attending PT for the last 2wks. O:   Time 4351-3313     Visit  Repeat outcome measure at mid point and end. Pain 2/10     Strength      Palpation      ROM R shoulder:  flex/abd= 120*/100*      IR/ER= L4/back of head     Modalities      Full-plank alternate UE & LE ext 3x 10 R & L  NR   Stretch      Doorway Stretch high, mid, & low 3x 30s  TE   Bear crawl  6x 20lb DB 30ft NR   Bear Crawl position scap rows 3x 10 R & L 20lb DB  NR   Backwards Crab Walk 6x 30ft NR   Side-ways lateral Bear Crawl 6x 30ft NR   Full-plank walk-ups on step 3x 10 R & L 8'' step NR   Exercise      Full-Plank position lateral walk up R & L 3x 10 each 8'' step NR   Extended BUE walk up on 8'' step 1x 10 10s hold  NR   A:  Tolerated well. The pt progressed with today's Tx session to perform bear crawl with 20lbs DB, bear crawl rows, backwards crab walk, lateral walk-ups, & forward prolong hold walk up. P: Continue with rehab plan & progress to include Bosu Ball push-ups, furniture movers bear crawls, & usman-disc exercises.      Moshe Yo, PT [facility] [id number]    Treatment Charges: Mins Units   Initial Evaluation     Re-Evaluation     Ther Exercise         TE 5 NB   Manual Therapy     MT     Ther Activities        TA     Gait Training          GT     Neuro Re-education NR 40 3   Modalities     Non-Billable Service Time     Other     Total Time/Units 45 3

## 2021-06-16 ENCOUNTER — TREATMENT (OUTPATIENT)
Dept: PHYSICAL THERAPY | Age: 26
End: 2021-06-16
Payer: COMMERCIAL

## 2021-06-16 DIAGNOSIS — Z98.890 STATUS POST REPAIR OF PECTORALIS MUSCLE TEAR: Primary | ICD-10-CM

## 2021-06-16 PROCEDURE — 97112 NEUROMUSCULAR REEDUCATION: CPT | Performed by: PHYSICAL THERAPIST

## 2021-06-16 NOTE — PROGRESS NOTES
[address] and Rehabilitation   Phone: [phone]   Fax: [phone]      Physical Therapy Daily Treatment Note    Date: 2021  Patient Name: Harry Panda  : 1995   MRN: [id number]  DOSx: 21   Referring Provider: Taylor Munoz MD  [address] [facility]     Medical Diagnosis:     s/p pectoralis repair   Outcome Measure:    quickdash    22.73               20-39%    S: The pt presents with no new complaints today. The pt reports that he is exercising at the fitness center without difficulty including modified assisted pull-ups. O:   Time 4404-0369     Visit  Repeat outcome measure at mid point and end. Pain 2/10     Strength      Palpation      ROM R shoulder:  flex/abd= 120*/100*      IR/ER= L4/back of head     Thrusters push-ups 2x 10  NR    push-ups full-plank 2x 10  NR   Full-plank Bosu Ball Push-Ups 2x 10  NR   Full-plank RUE usman-disc push-ups 2x 10  NR   Exercise      Band scap rows high & mid 10x 10s purple NR   Band sh sh high & mid 10x 10s purple NR   Band sh overhead add behind head 10x 10s purple NR   Band RUE lat pull-down 10x 10s purple NR   Band chest press 10x 10s purple NR   Band chest flys 3x 10 purple NR   A:  Tolerated well. The pt progressed with today's Tx session to perform purple elastic band exercises & new push-ups exercises. Pt experienced no abnormal difficulty or pain with exercises    P: Continue with rehab plan & progress to include to include furniture movers bear crawling.      Valerie Bales, PT [facility] 70978    Treatment Charges: Mins Units   Initial Evaluation     Re-Evaluation     Ther Exercise         TE     Manual Therapy     MT     Ther Activities        TA     Gait Training          GT     Neuro Re-education NR 40 3   Modalities     Non-Billable Service Time     Other     Total Time/Units 40 3

## 2021-06-18 ENCOUNTER — TREATMENT (OUTPATIENT)
Dept: PHYSICAL THERAPY | Age: 26
End: 2021-06-18
Payer: COMMERCIAL

## 2021-06-18 DIAGNOSIS — Z98.890 STATUS POST REPAIR OF PECTORALIS MUSCLE TEAR: Primary | ICD-10-CM

## 2021-06-18 PROCEDURE — 97112 NEUROMUSCULAR REEDUCATION: CPT | Performed by: PHYSICAL THERAPIST

## 2021-06-18 PROCEDURE — 97110 THERAPEUTIC EXERCISES: CPT | Performed by: PHYSICAL THERAPIST

## 2021-06-18 NOTE — PROGRESS NOTES
[address] [facility]   Phone: [phone]   Fax: [phone]      Physical Therapy Daily Treatment Note    Date: 2021  Patient Name: Naeem Trevino  : 1995   MRN: [id number]  DOSx: 21   Referring Provider: Nelda Kay MD  [address] [facility]     Medical Diagnosis:     s/p pectoralis repair   Outcome Measure:    quickdash    22.73               20-39%    S: The pt reported slight soreness at right pec following his most recent Tx session. O:   Time 9420-6046     Visit  Repeat outcome measure at mid point and end. Assisted hand stand 1x 2min  NR   Assisted hand-stand push-ups 3x 10  NR   T-Bar position 2x 5 10s holds R & L  NR   BUE hand on wall  10x 10s hold  NR   Full-plank Bosu Ball Push-Ups x30 x10 normal  x10 RLE hip ext  10x LLE hip ext NR   Full-plank RUE usman-disc push-ups 3x 10  NR   Doorway Stretch high, mid, & low 3x 30s  TE   UBE 2min forward  2min backward  TE   A:  Tolerated well. The pt progressed to perform T-Bar position, bosu ball with hip ext, assisted hand stand push-ups, & hand on wall position all without diffculty & without pain. P: Continue with rehab plan & progress to include to include furniture movers bear crawling.      Moshe Yo, PT OHPT [id number]    Treatment Charges: Mins Units   Initial Evaluation     Re-Evaluation     Ther Exercise         TE 10 1   Manual Therapy     MT     Ther Activities        TA     Gait Training          GT     Neuro Re-education NR 30 2   Modalities     Non-Billable Service Time     Other     Total Time/Units 40 3

## 2021-06-21 ENCOUNTER — OFFICE VISIT (OUTPATIENT)
Dept: ORTHOPEDIC SURGERY | Age: 26
End: 2021-06-21
Payer: COMMERCIAL

## 2021-06-21 ENCOUNTER — TREATMENT (OUTPATIENT)
Dept: PHYSICAL THERAPY | Age: 26
End: 2021-06-21
Payer: COMMERCIAL

## 2021-06-21 VITALS — BODY MASS INDEX: 24.33 KG/M2 | HEIGHT: 67 IN | WEIGHT: 155 LBS

## 2021-06-21 DIAGNOSIS — S29.011A RUPTURE OF PECTORALIS MAJOR MUSCLE, INITIAL ENCOUNTER: ICD-10-CM

## 2021-06-21 DIAGNOSIS — Z98.890 STATUS POST REPAIR OF PECTORALIS MUSCLE TEAR: Primary | ICD-10-CM

## 2021-06-21 PROCEDURE — G8427 DOCREV CUR MEDS BY ELIG CLIN: HCPCS | Performed by: NURSE PRACTITIONER

## 2021-06-21 PROCEDURE — 97112 NEUROMUSCULAR REEDUCATION: CPT

## 2021-06-21 PROCEDURE — 99213 OFFICE O/P EST LOW 20 MIN: CPT | Performed by: NURSE PRACTITIONER

## 2021-06-21 PROCEDURE — G8420 CALC BMI NORM PARAMETERS: HCPCS | Performed by: NURSE PRACTITIONER

## 2021-06-21 PROCEDURE — 97110 THERAPEUTIC EXERCISES: CPT

## 2021-06-21 PROCEDURE — 1036F TOBACCO NON-USER: CPT | Performed by: NURSE PRACTITIONER

## 2021-06-21 NOTE — PROGRESS NOTES
Follow Up Post Operative Visit     Surgery: Right pectoralis major tendon tear  Date: 2/11/2021    Subjective:    Albert Guevara is here for follow up visit s/p above procedure. He is doing well. He has been pliant with postoperative plan of care. He continues with physical therapy as directed. REVIEW OF SYSTEMS:     General: Negative Fever, chills, fatigue   Cardiovascular: Negative chest pain, palpitations  Respiratory: Equal chest expansion, negative shortness of breath   Skin: Negative rash, open wounds  Psych: Normal affect, mood stable  Neurologic: sensation grossly intact in extremities   Musculoskeletal: See HPI      Controlled Substances Monitoring:        Physical Exam:    Height: 5' 7\" (1.702 m), Weight: 155 lb (70.3 kg) (per pt)    General: Alert and oriented x3, no acute distress  Cardiovascular/pulmonary: No labored breathing, peripheral perfusion intact  Musculoskeletal:    Exam left shoulder shows well-healed incision. Pec contour is normal.  There is no swelling or tenderness present on exam today. Imaging: No imaging obtained today. Previous imaging was reviewed    Assessment and Plan: Status post 4 months out from right pectoralis major repair    Discussed his right pec. Patient is 4 months out from procedure listed above. He is doing very well. He has gotten with physical therapy and has been going routinely as directed. He has progressed to push-ups with physical therapy and feels he has made major improvements. We discussed that he can progress to bench press with the smith machine at his gym. He will begin with light weight and progress. Patient verbalized understanding. He will follow-up in 6 weeks.       Mary Alice Yu, [address] [facility]  Orthopedic Surgery   06/21/21  2:07 PM

## 2021-06-21 NOTE — PROGRESS NOTES
[address] [facility]   Phone: [phone]   Fax: [phone]      Physical Therapy Daily Treatment Note    Date: 2021  Patient Name: Priyank Bar  : 1995   MRN: [id number]  DOSx: 21   Referring Provider: Jose Rivero MD  [address] [facility]     Medical Diagnosis:     s/p pectoralis repair   Outcome Measure:    quickdash    22.73               20-39%    S: Pt reports from doctor office next door. He reports good report and denies any c/o upon arrival. Pt reports he was cleared to start slowly progressing bench press. O:   Time 139-220     Visit 10/18 Repeat outcome measure at mid point and end. BOSU plank c moving tennis ball in a [place] 1 min x 3  NR   Plank rows  30x  10#  NR    Stir the pot  20x each  Clockwise/counterclockwise NR   Supine press  30x 20# NR          NR    NR    NR    NR   Full-plank Bosu Ball Push-Ups x30 5s hold  x10 normal  x10 RLE hip ext  10x LLE hip ext NR   Full-plank RUE usman-disc push-ups 3x 10  NR   Doorway Stretch high, mid, & low 3x 30s  TE   UBE 3min forward  3min backward  TE   A:  Tolerated well. The pt continues to progress towards all rehab goals. He denies any c/o of pain post treatment session. Physical therapist supervised last 10 mins of treatment session.     P: Continue with rehab plan & progress  Kyra Fowler PTA [id number]    Treatment Charges: Mins Units   Initial Evaluation     Re-Evaluation     Ther Exercise         TE 10 1   Manual Therapy     MT     Ther Activities        TA     Gait Training          GT     Neuro Re-education NR 31 2   Modalities     Non-Billable Service Time     Other     Total Time/Units 41 3

## 2021-06-30 ENCOUNTER — TREATMENT (OUTPATIENT)
Dept: PHYSICAL THERAPY | Age: 26
End: 2021-06-30
Payer: COMMERCIAL

## 2021-06-30 DIAGNOSIS — Z98.890 STATUS POST REPAIR OF PECTORALIS MUSCLE TEAR: Primary | ICD-10-CM

## 2021-06-30 PROCEDURE — 97112 NEUROMUSCULAR REEDUCATION: CPT

## 2021-06-30 PROCEDURE — 97110 THERAPEUTIC EXERCISES: CPT

## 2021-06-30 NOTE — PROGRESS NOTES
[address] [facility]   Phone: [phone]   Fax: [phone]      Physical Therapy Daily Treatment Note    Date: 2021  Patient Name: Jose Roberto Hathaway  : 1995   MRN: [id number]  DOSx: 21   Referring Provider: Nani Neal MD  [address]  [facility]     Medical Diagnosis:     s/p pectoralis repair   Outcome Measure:    quickdash    22.73               20-39%    S: Pt reports he has been going to the gym and progressing c supine press. Reports minor discomfort c OH lifting activity. O:   Time 4264-4893     Visit  Repeat outcome measure at mid point and end. Supine press       Standing OH press  1x10 10#  3x10 15#  Pain Free TE   Supine Fly 30x 10#  TE   Close  press  30x 15#  TE         BOSU plank c moving tennis ball in a [place] 1 min x 3  NR   Plank rows  30x  10#  NR    Stir the pot  20x each  Clockwise/counterclockwise NR   Supine press  30x 20# NR   BOSU Push-ups  30x c 5s holds   NR   BOSU alt forearm to plank 30x   NR          NR    NR    NR    NR   Full-plank push-up position rows 3x 10 20lb NR   x10 normal  x10 RLE hip ext  10x LLE hip ext NR    NR   Doorway Stretch high, mid, & low 3x 30s  TE   UBE 3min forward  3min backward  TE   A:  Tolerated well. No c/o of pain during treatment session. Pt will be re-evaluated next treatment session.      P: Continue with rehab plan & progress  Kyra Fowler PTA [id number]    Treatment Charges: Mins Units   Initial Evaluation     Re-Evaluation     Ther Exercise         TE 10 1   Manual Therapy     MT     Ther Activities        TA     Gait Training          GT     Neuro Re-education NR 30 2   Modalities     Non-Billable Service Time     Other     Total Time/Units 40 3

## 2021-07-01 ENCOUNTER — TREATMENT (OUTPATIENT)
Dept: PHYSICAL THERAPY | Age: 26
End: 2021-07-01
Payer: COMMERCIAL

## 2021-07-01 DIAGNOSIS — Z98.890 STATUS POST REPAIR OF PECTORALIS MUSCLE TEAR: Primary | ICD-10-CM

## 2021-07-01 PROCEDURE — 97112 NEUROMUSCULAR REEDUCATION: CPT | Performed by: PHYSICAL THERAPIST

## 2021-07-01 PROCEDURE — 97110 THERAPEUTIC EXERCISES: CPT | Performed by: PHYSICAL THERAPIST

## 2021-07-01 NOTE — PROGRESS NOTES
[address] [facility]   Phone: [phone]   Fax: [phone]    Re-evaluation    Date:  2021   Patient: Grace Wu                  : 1995                    MRN: [id number]  Referring Provider: Charles Reid MD  [address]  [facility]                                 Medical Diagnosis:      s/p pectoralis repair               CERTIFICATION PERIOD:  2021 to 2021    ATTENDANCE:  Patient has attended 13 of 13 scheduled treatments from 2021   to 2021. TREATMENTS RECEIVED:  Therapeutic activity, Therapeutic exercise, neuromuscular reeducation, HEP    INITIAL STATUS:  Observations: normal orthopedic exam     Inspection: normal orthopedic exam.  .        Joint/Motion:  Right Shoulder:  flex/abd= 120*/100*; IR/ER= L4/back of head     Left Shoulder: WNL for all ranges         Strength:  grossly 4, 4+/5 for all ranges R shoulder; 5/5 across R elbow/wrist     Special Tests/Functional Screens:  deferred due to dx    CURRENT STATUS:    · ROM: pt presents with full pain free arom WFL to WNL for all right sh movements. · ROM: pt presents with full pain free prom WFL to WNL for all right sh movements. · Strength: right sh MMT flex, abd, add, ER, IR, ext, & horizontal abd & add all 5/5 pain free testing. · Palpation: pt c/o tightness with physical palpation to right pec. COMMENTS AND RECOMMENDATIONS:   The pt will be 20wks post op tomorrow 2021. The pt has appropriately progressed with outpt PT Rehab to perform body weighted exercises varying push-ups, bear crawl, crab walk, & etc without any abnormal pain. The pt continues to c/o pec soreness with weight training (Hammer Strength). PT advised the pt to slowly return to weight lifting with at least 50% reduction of weight (resistance).   PT will continue with outpt Rehab with advance dynamic & plymometric resistive training to progress for appropriate D/C to HEP & personal weight lifting. Thank you for the opportunity to work with your patient. Jasmine Sy, PT OHPT [id number]    I CERTIFY THAT THE ABOVE REASSESSMENT AND PLAN OF CARE FOR PHYSICAL THERAPY SERVICES ARE APPROPRIATE AND MEDICALLY NECESSARY.     Duration: From 7/1/2021 thru 9/1/2021    ________________________                _______________  Physician     Date

## 2021-07-01 NOTE — PROGRESS NOTES
[address] [facility] [address] [facility]   Phone: [phone]   Fax: [phone]      Physical Therapy Daily Treatment Note    Date: 2021  Patient Name: Elzbieta Rivera  : 1995   MRN: [id number]  DOSx: 21   Referring Provider: Elisha Romo MD  [address] [facility]     Medical Diagnosis:     s/p pectoralis repair   Outcome Measure:    quickdash    22.73               20-39%    S: The pt reports that he is limiting weight lifting at [facility] to plate loaded Hammer Strength. O:   Time 2710-6327     Visit  Repeat outcome measure at mid point and end. Supine press       Standing overhead sh press  1x10 Arnold's  1x 10 Wide   1x 10 Narrow   Pain Free  All 20lb DB TE   Supine Chest Fly 10x 20lb DB emphasis eccentric TE                NR    NR    NR    NR   x10 normal  x10 RLE hip ext  10x LLE hip ext NR    NR   Bear crawl  4x 20lb DB 30ft NR   Bear Crawl position scap rows 2x 10 R & L 20lb DB  NR   Backwards Crab Walk 4x 30ft NR   UBE 2min forward  2min backward Highest resistance TE   Standing prolong overhead sh press 1x 45s hold 20lb DB NR   Full-Plank alternate UE & LE ext 10x  NR   Full-Plank spider-man push-ups 2x 5 R & L Incline push-ups 1x 10   NR   Full-plank push-ups within bottom 1/2 x10  NR   Full-plank push-ups within top 1/2 x10  NR   Bent over reverse flys x10 10lb DB emphasis eccentric  NR   Lateral bear crawl  4x  30ft NR   Donkey kicks x10  NR   A:  Tolerated well. Pt was pain free during entire exercise program.  Pt progressed to perform incline push-ups & spider-man push-ups. P: Continue with rehab plan & progress to include new push-ups with pyrometrics, & resume T-Bar & wall position.       Jasmine Sy, PT OHPT [id number]    Treatment Charges: Mins Units   Initial Evaluation     Re-Evaluation     Ther Exercise         TE 10 1   Manual Therapy     MT     Ther Activities        TA     Gait Training          GT     Neuro Re-education NR 30 2   Modalities     Non-Billable Service Time     Other     Total Time/Units 40 3

## 2021-07-12 ENCOUNTER — TREATMENT (OUTPATIENT)
Dept: PHYSICAL THERAPY | Age: 26
End: 2021-07-12
Payer: COMMERCIAL

## 2021-07-12 DIAGNOSIS — Z98.890 STATUS POST REPAIR OF PECTORALIS MUSCLE TEAR: Primary | ICD-10-CM

## 2021-07-12 PROCEDURE — 97110 THERAPEUTIC EXERCISES: CPT

## 2021-07-12 PROCEDURE — 97112 NEUROMUSCULAR REEDUCATION: CPT

## 2021-07-12 NOTE — PROGRESS NOTES
[address] and Rehabilitation   Phone: [phone]   Fax: [phone]      Physical Therapy Daily Treatment Note    Date: 2021  Patient Name: Adam Moreno  : 1995   MRN: [id number]  DOSx: 21   Referring Provider: Marcio Mora MD  [address] [facility]     Medical Diagnosis:     s/p pectoralis repair   Outcome Measure:    quickdash    22.73               20-39%    S: The pt reports he performed chest press with about 135lbs. O:   Time 930-1015     Visit  Repeat outcome measure at mid point and end. Supine press       Standing overhead sh press  1x10 Arnold's  1x 10 Wide   1x 10 Narrow   Pain Free  All 20lb DB TE   Supine Chest Fly 10x 20lb DB emphasis eccentric TE                NR    NR    NR    NR   x10 normal  x10 RLE hip ext  10x LLE hip ext NR    NR   Bear crawl  4x 20lb DB 30ft NR   Bear Crawl position scap rows 2x 10 R & L 20lb DB  NR   Backwards Crab Walk 4x 30ft NR   UBE 2min forward  2min backward Highest resistance TE   Standing prolong overhead sh press 1x 45s hold 20lb DB NR   Full-Plank alternate UE & LE ext 10x  NR   Full-Plank spider-man push-ups 2x 5 R & L Incline push-ups 1x 10   NR   Full-plank push-ups within bottom 1/2 x10  NR   Full-plank push-ups within top 1/2 x10  NR   emphasis eccentric  NR   30ft NR    NR   A:  Tolerated well. Reviewed pt PT program. Pt was transitioned to I HEP this session and he was educated to call of if he had any questions. P: Continue with rehab plan & progress to include new push-ups with pyrometrics, & resume T-Bar & wall position.     Baylee Winchester PTA [id number]    Treatment Charges: Mins Units   Initial Evaluation     Re-Evaluation     Ther Exercise         TE 10 1   Manual Therapy     MT     Ther Activities        TA     Gait Training          GT     Neuro Re-education NR 35 2   Modalities     Non-Billable Service Time     Other     Total Time/Units 45 3

## 2022-04-02 ENCOUNTER — HOSPITAL ENCOUNTER (EMERGENCY)
Age: 27
Discharge: HOME | End: 2022-04-02
Payer: COMMERCIAL

## 2022-04-02 VITALS
WEIGHT: 150 LBS | TEMPERATURE: 97.9 F | SYSTOLIC BLOOD PRESSURE: 130 MMHG | OXYGEN SATURATION: 98 % | DIASTOLIC BLOOD PRESSURE: 84 MMHG | HEART RATE: 77 BPM | BODY MASS INDEX: 23.54 KG/M2 | HEIGHT: 67 IN

## 2022-04-02 DIAGNOSIS — S81.811A LACERATION OF RIGHT LOWER EXTREMITY, INITIAL ENCOUNTER: Primary | ICD-10-CM

## 2022-04-02 PROCEDURE — 99282 EMERGENCY DEPT VISIT SF MDM: CPT

## 2022-04-02 PROCEDURE — 90714 TD VACC NO PRESV 7 YRS+ IM: CPT | Performed by: PHYSICIAN ASSISTANT

## 2022-04-02 PROCEDURE — 90471 IMMUNIZATION ADMIN: CPT | Performed by: PHYSICIAN ASSISTANT

## 2022-04-02 PROCEDURE — 12001 RPR S/N/AX/GEN/TRNK 2.5CM/<: CPT

## 2022-04-02 PROCEDURE — 6360000002 HC RX W HCPCS: Performed by: PHYSICIAN ASSISTANT

## 2022-04-02 RX ORDER — TETANUS AND DIPHTHERIA TOXOIDS ADSORBED 2; 2 [LF]/.5ML; [LF]/.5ML
0.5 INJECTION INTRAMUSCULAR ONCE
Status: COMPLETED | OUTPATIENT
Start: 2022-04-02 | End: 2022-04-02

## 2022-04-02 RX ADMIN — TETANUS AND DIPHTHERIA TOXOIDS ADSORBED 0.5 ML: 2; 2 INJECTION INTRAMUSCULAR at 19:04

## 2022-04-02 ASSESSMENT — ENCOUNTER SYMPTOMS
COLOR CHANGE: 0
CHEST TIGHTNESS: 0
COUGH: 0
SHORTNESS OF BREATH: 0

## 2022-04-02 NOTE — ED PROVIDER NOTES
[facility]        Department of Emergency Medicine   ED  Provider Note  Admit Date/RoomTime: 4/2/2022  6:09 PM  ED Room: [place] [address]  HPI:  4/2/22, Time: 7:02 PM EDT      The patient is a 81-year-old male presenting to the emergency department laceration to his right leg. 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 He states the glass did not shatter and he does not have any concern for any glass in the wound. He is not sure of his last tetanus. Patient denies any excessive bleeding, numbness or tingling, significant swelling, difficulty ambulating. The history is provided by the patient. No  was used. REVIEW OF SYSTEMS:  Review of Systems   Constitutional: Negative for activity change, chills, fatigue and fever. Respiratory: Negative for cough, chest tightness and shortness of breath. Cardiovascular: Negative for chest pain, palpitations and leg swelling. Musculoskeletal: Negative for arthralgias, myalgias, neck pain and neck stiffness. Skin: Positive for wound. Negative for color change, pallor and rash. Neurological: Negative for dizziness, weakness, light-headedness and headaches. Hematological: Does not bruise/bleed easily. Psychiatric/Behavioral: Negative for agitation, behavioral problems and confusion. Pertinent positives and negatives are stated within HPI, all other systems reviewed and are negative.      --------------------------------------------- PAST HISTORY ---------------------------------------------  Past Medical History:  has a past medical history of Asthma, PONV (postoperative nausea and vomiting), and Torn muscle. Past Surgical History:  has a past surgical history that includes Anterior cruciate ligament repair (Left, 2015); Chest surgery (Right, 2/11/2021); and Barn[place] tooth extraction (06/2021).     Social History:  reports that he has never smoked. He has never used smokeless tobacco. He reports current alcohol use. He reports current drug use. Drug: Marijuana Fronshady Jorge). Family History: family history is not on file. The patients home medications have been reviewed. Allergies: Patient has no known allergies. -------------------------------------------------- RESULTS -------------------------------------------------  All laboratory and radiology results have been personally reviewed by myself   LABS:  No results found for this visit on 04/02/22. RADIOLOGY:  Interpreted by Radiologist.  No orders to display       ------------------------- NURSING NOTES AND VITALS REVIEWED ---------------------------   The nursing notes within the ED encounter and vital signs as below have been reviewed. /84   Pulse 77   Temp 97.9 °F (36.6 °C) (Oral)   Ht 5' 7\" (1.702 m)   Wt 150 lb (68 kg)   SpO2 98%   BMI 23.49 kg/m²   Oxygen Saturation Interpretation: Normal      ---------------------------------------------------PHYSICAL EXAM--------------------------------------    Physical Exam  Vitals and nursing note reviewed. Constitutional:       General: He is not in acute distress. Appearance: Normal appearance. He is well-developed. He is not ill-appearing. HENT:      Head: Normocephalic and atraumatic. Mouth/Throat:      Mouth: Mucous membranes are moist.      Pharynx: Oropharynx is clear. Cardiovascular:      Rate and Rhythm: Normal rate and regular rhythm. Heart sounds: Normal heart sounds. No murmur heard. Pulmonary:      Effort: Pulmonary effort is normal. No respiratory distress. Breath sounds: Normal breath sounds. Musculoskeletal:         General: No swelling, tenderness or deformity. Normal range of motion. Cervical back: Normal range of motion and neck supple. No rigidity. Comments: 2.5 cm laceration to medial/mid RT calf. FROM of ankle. Minimal active bleeding. Skin:     General: Skin is warm and dry. Capillary Refill: Capillary refill takes less than 2 seconds. Findings: No erythema. Neurological:      General: No focal deficit present. Mental Status: He is alert and oriented to person, place, and time. Mental status is at baseline. Coordination: Coordination normal.   Psychiatric:         Mood and Affect: Mood normal.         Behavior: Behavior normal.         Thought Content: Thought content normal.            ------------------------------ ED COURSE/MEDICAL DECISION MAKING----------------------  Medications   diptheria-tetanus toxoids (DECAVAC) 2-2 LF/0.5ML injection 0.5 mL (has no administration in time range)         ED COURSE:       Procedures:  Lac Repair    Date/Time: 4/2/2022 7:09 PM  Performed by: Celestina Waters PA-C  Authorized by: Celestina Waters PA-C     Consent:     Consent obtained:  Verbal    Consent given by:  Patient    Risks discussed:  Infection, pain, poor cosmetic result and need for additional repair    Alternatives discussed:  No treatment  Anesthesia (see MAR for exact dosages):      Anesthesia method:  Local infiltration    Local anesthetic:  Lidocaine 1% w/o epi  Laceration details:     Location:  Leg    Leg location:  R lower leg    Length (cm):  2.5  Repair type:     Repair type:  Simple  Exploration:     Hemostasis achieved with:  Direct pressure    Wound exploration: wound explored through full range of motion      Contaminated: no    Treatment:     Area cleansed with:  Hibiclens and saline    Amount of cleaning:  Standard    Irrigation solution:  Sterile saline    Irrigation method:  Syringe    Visualized foreign bodies/material removed: no    Skin repair:     Repair method:  Sutures    Suture size:  4-0    Suture material:  Prolene    Suture technique:  Simple interrupted    Number of sutures:  6  Approximation:     Approximation:  Close  Post-procedure details:     Dressing:  Non-adherent dressing    Patient tolerance of procedure: Tolerated well, no immediate complications         Medical Decision Making:   MDM   12year-old male presenting to emergency department for laceration to his right calf on a piece of glass while at work. Patient has no signs of neurovascular compromise. He is up-to-date on his tetanus in the ED. Wound was extensively irrigated and repaired with 6 sutures. Patient tolerated without difficulty. He is educated on proper wound care and advised to follow-up with occupational medicine to have the sutures removed. Patient agreeable to the plan and discharged home in good condition. Counseling: The emergency provider has spoken with the patient and discussed todays results, in addition to providing specific details for the plan of care and counseling regarding the diagnosis and prognosis. Questions are answered at this time and they are agreeable with the plan.      --------------------------------- IMPRESSION AND DISPOSITION ---------------------------------    IMPRESSION  1. Laceration of right lower extremity, initial encounter        DISPOSITION  Disposition: Discharge to home  Patient condition is good      Electronically signed by Liss Bailey PA-C   DD: 4/2/22  **This report was transcribed using voice recognition software. Every effort was made to ensure accuracy; however, inadvertent computerized transcription errors may be present.   END OF ED PROVIDER NOTE          Liss Bailey PA-C  04/02/22 [id number]

## 2022-04-02 NOTE — Clinical Note
Angeles Urbina was seen and treated in our emergency department on 4/2/2022. He may return to work on 04/03/2022. If you have any questions or concerns, please don't hesitate to call.       Deisy Hamilton PA-C

## 2022-04-02 NOTE — Clinical Note
Julienne Paz was seen and treated in our emergency department on 4/2/2022. He may return to work on 04/03/2022. If you have any questions or concerns, please don't hesitate to call.       Courtney Hammer PA-C

## 2022-04-08 ENCOUNTER — HOSPITAL ENCOUNTER (EMERGENCY)
Age: 27
Discharge: HOME | End: 2022-04-08
Payer: COMMERCIAL

## 2022-04-08 VITALS
DIASTOLIC BLOOD PRESSURE: 84 MMHG | OXYGEN SATURATION: 98 % | TEMPERATURE: 97.2 F | WEIGHT: 150 LBS | HEART RATE: 80 BPM | SYSTOLIC BLOOD PRESSURE: 139 MMHG | RESPIRATION RATE: 14 BRPM | BODY MASS INDEX: 23.54 KG/M2 | HEIGHT: 67 IN

## 2022-04-08 DIAGNOSIS — Z48.02 VISIT FOR SUTURE REMOVAL: Primary | ICD-10-CM

## 2022-04-08 PROCEDURE — 99282 EMERGENCY DEPT VISIT SF MDM: CPT

## 2022-04-08 PROCEDURE — 99283 EMERGENCY DEPT VISIT LOW MDM: CPT

## 2022-04-08 NOTE — ED PROVIDER NOTES
[facility]          Department of Emergency Medicine   ED  Provider Note  Admit Date/RoomTime: 4/8/2022 11:13 AM  ED Room: 35/   Chief Complaint   Suture / Staple Removal (pt needs sutures removed from right calf area. )    History of Present Illness      Miracle Staples is a 23 y.o. old male who presents for removal of sutures to right leg from 6 days ago. He denies any new injury or trauma. He has no fever/chills, drainage or bleeding from wound, numbness/tingling, or difficulty with ambulation or balance. He is alert and oriented x3 and in no apparent distress at this exam. Patient is nontoxic appearing. Tetanus Status:up to date. Current antibiotic use: None. ROS   Pertinent positives and negatives are stated within HPI, all other systems reviewed and are negative. Past Medical History:   Past Medical History:   Diagnosis Date    Asthma     as a child sports induced / resolved    PONV (postoperative nausea and vomiting)     Torn muscle     right pectoralis     Past Surgical History:   Past Surgical History:   Procedure Laterality Date    ANTERIOR CRUCIATE LIGAMENT REPAIR Left 2015    CHEST SURGERY Right 2/11/2021    RIGHT PECTORALIS REPAIR   ++BEACH CHAIR++  ++ARTHREX++    ++ISB++ performed by Vikas Anderson MD at [address] [place] [address]  06/2021    x 4     Social History:  reports that he has never smoked. He has never used smokeless tobacco. He reports current alcohol use. He reports current drug use. Drug: Marijuana Matt [place]). Family History: family history is not on file. Allergies: Patient has no known allergies. Physical Exam     ED Triage Vitals [04/08/22 1112]   BP Temp Temp Source Pulse Resp SpO2 Height Weight   139/84 97.2 °F (36.2 °C) Temporal 80 14 98 % 5' 7\" (1.702 m) 150 lb (68 kg)      Oxygen Saturation Interpretation: Normal.    Constitutional:  Alert, development consistent with age. HEENT:  NC/NT. Airway patent. Neck:  Normal ROM. Supple. Integument:  No rashes, erythema present. Lymphatics: No lymphangitis or adenopathy noted. Extremities:  6 intact sutures to posterior right calf with no active bleeding or drainage, no edema or erythema, non-tender   Neurological:  Motor functions intact. Lab / Imaging Results   (All laboratory and radiology results have been personally reviewed by myself)  Labs:  No results found for this visit on 04/08/22. Imaging: All Radiology results interpreted by Radiologist unless otherwise noted. No orders to display     ED Course / Medical Decision Making   Medications - No data to display     Consult(s):  None    Procedure(s):  6 sutures removed with no difficulty  dermabond applied to wound    MDM:      Counseling: The emergency provider has spoken with the patient/caregiver and discussed todays results, in addition to providing specific details for the plan of care and counseling regarding the diagnosis and prognosis. Questions are answered at this time and they are agreeable with the plan. Patient was, again, educated on signs and symptoms of infection. Patient understands they must follow up with PCP. They were advised to return to the ED if symptoms worsen or if new symptoms develop. Pt remained nontoxic, afebrile, and A&O x4 during this ED visit. They agreed with plan of care, discharge, and importance of follow-up. Pt was in no distress at discharge. Vitals stable. Patient was educated on newly prescribed medications. At this time the patient is without objective evidence of an acute process requiring hospitalization or inpatient management. They have remained hemodynamically stable throughout their entire ED visit and are stable for discharge with outpatient follow-up. Assessment     1.  Visit for suture removal      Plan   Discharge to home  Patient condition is good    New Medications     Discharge Medication List as of 4/8/2022 11:53 AM        Electronically signed by Viktor Leigh RADHA Waters   DD: 4/8/22  **This report was transcribed using voice recognition software. Every effort was made to ensure accuracy; however, inadvertent computerized transcription errors may be present.     END OF ED PROVIDER NOTE        Lane Alpers, PA-C  04/08/22 5112

## (undated) DEVICE — STRIP,CLOSURE,WOUND,MEDI-STRIP,1/4X3: Brand: MEDLINE

## (undated) DEVICE — Z DISCONTINUED USE 2275686 GLOVE SURG SZ 8 L12IN FNGR THK13MIL WHT ISOLEX POLYISOPRENE

## (undated) DEVICE — ADHESIVE SKIN CLSR 0.7ML TOP DERMBND ADV

## (undated) DEVICE — INSTRUMENT SYSTEM 4 BATTERY REUSABLE

## (undated) DEVICE — GAUZE,SPONGE,4"X4",8PLY,STRL,LF,10/TRAY: Brand: MEDLINE

## (undated) DEVICE — NEEDLE SUT T-5 L26.5MM 1/2 CIR TAPR W/ NIT LOOP

## (undated) DEVICE — TRAY DRILL SYSTEM 4 REUSABLE

## (undated) DEVICE — IMMOBILIZER SHLDR L10.5-17IN D7IN SLNG W/ 15DEG ABD PLLW

## (undated) DEVICE — SET ORTHO STD STORTSTD1

## (undated) DEVICE — NEEDLE HYPO 25GA L1.5IN BLU POLYPR HUB S STL REG BVL STR

## (undated) DEVICE — Device

## (undated) DEVICE — DRAPE,U/ SHT,SPLIT,PLAS,STERIL: Brand: MEDLINE

## (undated) DEVICE — Z DISCONTINUED PER MEDLINE USE 2741942 DRESSING AQUACEL 6 IN ALG W9XL15CM SIL CVR WTRPRF VIR BACT BARR ANTIMIC

## (undated) DEVICE — PACK PROCEDURE SURG GEN CUST

## (undated) DEVICE — SOLUTION IV IRRIG POUR BRL 0.9% SODIUM CHL 2F7124

## (undated) DEVICE — 4-PORT MANIFOLD: Brand: NEPTUNE 2

## (undated) DEVICE — 1000 S-DRAPE TOWEL DRAPE 10/BX: Brand: STERI-DRAPE™

## (undated) DEVICE — SYSTEM TPS ORTHO

## (undated) DEVICE — HAND SET

## (undated) DEVICE — GOWN,SIRUS,POLYRNF,SETINSLV,XL,20/CS: Brand: MEDLINE

## (undated) DEVICE — NEEDLE SYR 18GA L1.5IN RED PLAS HUB S STL BLNT FILL W/O

## (undated) DEVICE — BNDG,ELSTC,MATRIX,STRL,4"X5YD,LF,HOOK&LP: Brand: MEDLINE

## (undated) DEVICE — CONTROL SYRINGE LUER-LOCK TIP: Brand: MONOJECT

## (undated) DEVICE — TOWEL,OR,DSP,ST,BLUE,STD,6/PK,12PK/CS: Brand: MEDLINE

## (undated) DEVICE — DRAPE,HAND,STERILE: Brand: MEDLINE

## (undated) DEVICE — COVER HNDL LT DISP

## (undated) DEVICE — INTENDED FOR TISSUE SEPARATION, AND OTHER PROCEDURES THAT REQUIRE A SHARP SURGICAL BLADE TO PUNCTURE OR CUT.: Brand: BARD-PARKER ® STAINLESS STEEL BLADES

## (undated) DEVICE — GLOVE SURG SZ 8 L12IN FNGR THK94MIL STD WHT LTX FREE

## (undated) DEVICE — SYRINGE MED 50ML LUERLOCK TIP

## (undated) DEVICE — NEEDLE 1/2 CIR SZ 2 SURG MAYO CATGUT

## (undated) DEVICE — 3M™ COBAN™ NL STERILE NON-LATEX SELF-ADHERENT WRAP, 2084S, 4 IN X 5 YD (10 CM X 4,5 M), 18 ROLLS/CASE: Brand: 3M™ COBAN™

## (undated) DEVICE — DOUBLE BASIN SET: Brand: MEDLINE INDUSTRIES, INC.

## (undated) DEVICE — SOLUTION IV IRRIG WATER 1000ML POUR BRL 2F7114

## (undated) DEVICE — CONVERTORS STOCKINETTE: Brand: CONVERTORS

## (undated) DEVICE — CHLORAPREP 26ML ORANGE

## (undated) DEVICE — GLOVE SURG SZ 8 CRM LTX FREE POLYISOPRENE POLYMER BEAD ANTI